# Patient Record
Sex: MALE | URBAN - METROPOLITAN AREA
[De-identification: names, ages, dates, MRNs, and addresses within clinical notes are randomized per-mention and may not be internally consistent; named-entity substitution may affect disease eponyms.]

---

## 2018-12-06 ENCOUNTER — INPATIENT (INPATIENT)
Facility: HOSPITAL | Age: 73
LOS: 7 days | Discharge: NURSING, REHAB OR LONG-TERM CARE | DRG: 56 | End: 2018-12-14
Attending: INTERNAL MEDICINE | Admitting: INTERNAL MEDICINE
Payer: MEDICARE

## 2018-12-06 VITALS
HEIGHT: 70 IN | OXYGEN SATURATION: 99 % | SYSTOLIC BLOOD PRESSURE: 146 MMHG | HEART RATE: 92 BPM | RESPIRATION RATE: 16 BRPM | WEIGHT: 250 LBS | DIASTOLIC BLOOD PRESSURE: 96 MMHG

## 2018-12-06 DIAGNOSIS — I10 ESSENTIAL (PRIMARY) HYPERTENSION: ICD-10-CM

## 2018-12-06 DIAGNOSIS — M10.9 GOUT, UNSPECIFIED: ICD-10-CM

## 2018-12-06 DIAGNOSIS — F03.90 UNSPECIFIED DEMENTIA, UNSPECIFIED SEVERITY, WITHOUT BEHAVIORAL DISTURBANCE, PSYCHOTIC DISTURBANCE, MOOD DISTURBANCE, AND ANXIETY: ICD-10-CM

## 2018-12-06 DIAGNOSIS — Z29.9 ENCOUNTER FOR PROPHYLACTIC MEASURES, UNSPECIFIED: ICD-10-CM

## 2018-12-06 DIAGNOSIS — N17.9 ACUTE KIDNEY FAILURE, UNSPECIFIED: ICD-10-CM

## 2018-12-06 DIAGNOSIS — E86.0 DEHYDRATION: ICD-10-CM

## 2018-12-06 DIAGNOSIS — R45.1 RESTLESSNESS AND AGITATION: ICD-10-CM

## 2018-12-06 DIAGNOSIS — E87.0 HYPEROSMOLALITY AND HYPERNATREMIA: ICD-10-CM

## 2018-12-06 DIAGNOSIS — I25.10 ATHEROSCLEROTIC HEART DISEASE OF NATIVE CORONARY ARTERY WITHOUT ANGINA PECTORIS: ICD-10-CM

## 2018-12-06 LAB
ALBUMIN SERPL ELPH-MCNC: 2.4 G/DL — LOW (ref 3.3–5)
ALP SERPL-CCNC: 55 U/L — SIGNIFICANT CHANGE UP (ref 30–120)
ALT FLD-CCNC: 39 U/L DA — SIGNIFICANT CHANGE UP (ref 10–60)
ANION GAP SERPL CALC-SCNC: 7 MMOL/L — SIGNIFICANT CHANGE UP (ref 5–17)
APTT BLD: 28.5 SEC — SIGNIFICANT CHANGE UP (ref 28.5–37)
AST SERPL-CCNC: 25 U/L — SIGNIFICANT CHANGE UP (ref 10–40)
BASOPHILS # BLD AUTO: 0.03 K/UL — SIGNIFICANT CHANGE UP (ref 0–0.2)
BASOPHILS NFR BLD AUTO: 0.2 % — SIGNIFICANT CHANGE UP (ref 0–2)
BILIRUB SERPL-MCNC: 0.7 MG/DL — SIGNIFICANT CHANGE UP (ref 0.2–1.2)
BUN SERPL-MCNC: 30 MG/DL — HIGH (ref 7–23)
CALCIUM SERPL-MCNC: 9.3 MG/DL — SIGNIFICANT CHANGE UP (ref 8.4–10.5)
CHLORIDE SERPL-SCNC: 110 MMOL/L — HIGH (ref 96–108)
CO2 SERPL-SCNC: 35 MMOL/L — HIGH (ref 22–31)
CREAT SERPL-MCNC: 1.38 MG/DL — HIGH (ref 0.5–1.3)
EOSINOPHIL # BLD AUTO: 0.02 K/UL — SIGNIFICANT CHANGE UP (ref 0–0.5)
EOSINOPHIL NFR BLD AUTO: 0.2 % — SIGNIFICANT CHANGE UP (ref 0–6)
GLUCOSE SERPL-MCNC: 126 MG/DL — HIGH (ref 70–99)
HCT VFR BLD CALC: 43.2 % — SIGNIFICANT CHANGE UP (ref 39–50)
HGB BLD-MCNC: 13.3 G/DL — SIGNIFICANT CHANGE UP (ref 13–17)
IMM GRANULOCYTES NFR BLD AUTO: 0.9 % — SIGNIFICANT CHANGE UP (ref 0–1.5)
INR BLD: 1.3 RATIO — HIGH (ref 0.88–1.16)
LYMPHOCYTES # BLD AUTO: 0.98 K/UL — LOW (ref 1–3.3)
LYMPHOCYTES # BLD AUTO: 7.6 % — LOW (ref 13–44)
MCHC RBC-ENTMCNC: 29.7 PG — SIGNIFICANT CHANGE UP (ref 27–34)
MCHC RBC-ENTMCNC: 30.8 GM/DL — LOW (ref 32–36)
MCV RBC AUTO: 96.4 FL — SIGNIFICANT CHANGE UP (ref 80–100)
MONOCYTES # BLD AUTO: 1.19 K/UL — HIGH (ref 0–0.9)
MONOCYTES NFR BLD AUTO: 9.3 % — SIGNIFICANT CHANGE UP (ref 2–14)
NEUTROPHILS # BLD AUTO: 10.52 K/UL — HIGH (ref 1.8–7.4)
NEUTROPHILS NFR BLD AUTO: 81.8 % — HIGH (ref 43–77)
NRBC # BLD: 0 /100 WBCS — SIGNIFICANT CHANGE UP (ref 0–0)
PLATELET # BLD AUTO: 381 K/UL — SIGNIFICANT CHANGE UP (ref 150–400)
POTASSIUM SERPL-MCNC: 4.2 MMOL/L — SIGNIFICANT CHANGE UP (ref 3.5–5.3)
POTASSIUM SERPL-SCNC: 4.2 MMOL/L — SIGNIFICANT CHANGE UP (ref 3.5–5.3)
PROT SERPL-MCNC: 7.7 G/DL — SIGNIFICANT CHANGE UP (ref 6–8.3)
PROTHROM AB SERPL-ACNC: 14.3 SEC — HIGH (ref 10–12.9)
RBC # BLD: 4.48 M/UL — SIGNIFICANT CHANGE UP (ref 4.2–5.8)
RBC # FLD: 13.2 % — SIGNIFICANT CHANGE UP (ref 10.3–14.5)
SODIUM SERPL-SCNC: 152 MMOL/L — HIGH (ref 135–145)
WBC # BLD: 12.85 K/UL — HIGH (ref 3.8–10.5)
WBC # FLD AUTO: 12.85 K/UL — HIGH (ref 3.8–10.5)

## 2018-12-06 PROCEDURE — 99285 EMERGENCY DEPT VISIT HI MDM: CPT

## 2018-12-06 PROCEDURE — 71045 X-RAY EXAM CHEST 1 VIEW: CPT | Mod: 26

## 2018-12-06 PROCEDURE — 93010 ELECTROCARDIOGRAM REPORT: CPT

## 2018-12-06 RX ORDER — TAMSULOSIN HYDROCHLORIDE 0.4 MG/1
0.8 CAPSULE ORAL AT BEDTIME
Refills: 0 | Status: DISCONTINUED | OUTPATIENT
Start: 2018-12-06 | End: 2018-12-14

## 2018-12-06 RX ORDER — HEPARIN SODIUM 5000 [USP'U]/ML
5000 INJECTION INTRAVENOUS; SUBCUTANEOUS EVERY 12 HOURS
Refills: 0 | Status: DISCONTINUED | OUTPATIENT
Start: 2018-12-06 | End: 2018-12-14

## 2018-12-06 RX ORDER — ACETAMINOPHEN 500 MG
650 TABLET ORAL EVERY 6 HOURS
Refills: 0 | Status: DISCONTINUED | OUTPATIENT
Start: 2018-12-06 | End: 2018-12-14

## 2018-12-06 RX ORDER — PREGABALIN 225 MG/1
500 CAPSULE ORAL DAILY
Refills: 0 | Status: DISCONTINUED | OUTPATIENT
Start: 2018-12-06 | End: 2018-12-14

## 2018-12-06 RX ORDER — DIVALPROEX SODIUM 500 MG/1
1000 TABLET, DELAYED RELEASE ORAL AT BEDTIME
Refills: 0 | Status: DISCONTINUED | OUTPATIENT
Start: 2018-12-06 | End: 2018-12-10

## 2018-12-06 RX ORDER — DIVALPROEX SODIUM 500 MG/1
1000 TABLET, DELAYED RELEASE ORAL AT BEDTIME
Refills: 0 | Status: DISCONTINUED | OUTPATIENT
Start: 2018-12-06 | End: 2018-12-06

## 2018-12-06 RX ORDER — FAMOTIDINE 10 MG/ML
20 INJECTION INTRAVENOUS DAILY
Refills: 0 | Status: DISCONTINUED | OUTPATIENT
Start: 2018-12-06 | End: 2018-12-14

## 2018-12-06 RX ORDER — QUETIAPINE FUMARATE 200 MG/1
100 TABLET, FILM COATED ORAL AT BEDTIME
Refills: 0 | Status: DISCONTINUED | OUTPATIENT
Start: 2018-12-06 | End: 2018-12-14

## 2018-12-06 RX ORDER — TRAMADOL HYDROCHLORIDE 50 MG/1
25 TABLET ORAL EVERY 6 HOURS
Refills: 0 | Status: DISCONTINUED | OUTPATIENT
Start: 2018-12-06 | End: 2018-12-11

## 2018-12-06 RX ORDER — SENNA PLUS 8.6 MG/1
2 TABLET ORAL AT BEDTIME
Refills: 0 | Status: DISCONTINUED | OUTPATIENT
Start: 2018-12-06 | End: 2018-12-14

## 2018-12-06 RX ORDER — SODIUM CHLORIDE 9 MG/ML
1000 INJECTION INTRAMUSCULAR; INTRAVENOUS; SUBCUTANEOUS ONCE
Refills: 0 | Status: COMPLETED | OUTPATIENT
Start: 2018-12-06 | End: 2018-12-06

## 2018-12-06 RX ORDER — SODIUM CHLORIDE 9 MG/ML
1000 INJECTION, SOLUTION INTRAVENOUS
Refills: 0 | Status: DISCONTINUED | OUTPATIENT
Start: 2018-12-06 | End: 2018-12-14

## 2018-12-06 RX ORDER — DIVALPROEX SODIUM 500 MG/1
500 TABLET, DELAYED RELEASE ORAL
Refills: 0 | Status: DISCONTINUED | OUTPATIENT
Start: 2018-12-06 | End: 2018-12-10

## 2018-12-06 RX ORDER — PREGABALIN 225 MG/1
500 CAPSULE ORAL ONCE
Refills: 0 | Status: DISCONTINUED | OUTPATIENT
Start: 2018-12-06 | End: 2018-12-06

## 2018-12-06 RX ORDER — POLYETHYLENE GLYCOL 3350 17 G/17G
17 POWDER, FOR SOLUTION ORAL
Refills: 0 | Status: DISCONTINUED | OUTPATIENT
Start: 2018-12-06 | End: 2018-12-14

## 2018-12-06 RX ORDER — HALOPERIDOL DECANOATE 100 MG/ML
1 INJECTION INTRAMUSCULAR EVERY 6 HOURS
Refills: 0 | Status: DISCONTINUED | OUTPATIENT
Start: 2018-12-06 | End: 2018-12-08

## 2018-12-06 RX ADMIN — SODIUM CHLORIDE 1000 MILLILITER(S): 9 INJECTION INTRAMUSCULAR; INTRAVENOUS; SUBCUTANEOUS at 14:37

## 2018-12-06 RX ADMIN — SODIUM CHLORIDE 1000 MILLILITER(S): 9 INJECTION INTRAMUSCULAR; INTRAVENOUS; SUBCUTANEOUS at 13:37

## 2018-12-06 RX ADMIN — QUETIAPINE FUMARATE 100 MILLIGRAM(S): 200 TABLET, FILM COATED ORAL at 22:03

## 2018-12-06 RX ADMIN — HALOPERIDOL DECANOATE 1 MILLIGRAM(S): 100 INJECTION INTRAMUSCULAR at 17:39

## 2018-12-06 RX ADMIN — SENNA PLUS 2 TABLET(S): 8.6 TABLET ORAL at 22:02

## 2018-12-06 RX ADMIN — HEPARIN SODIUM 5000 UNIT(S): 5000 INJECTION INTRAVENOUS; SUBCUTANEOUS at 17:39

## 2018-12-06 RX ADMIN — DIVALPROEX SODIUM 1000 MILLIGRAM(S): 500 TABLET, DELAYED RELEASE ORAL at 22:03

## 2018-12-06 RX ADMIN — SODIUM CHLORIDE 100 MILLILITER(S): 9 INJECTION, SOLUTION INTRAVENOUS at 17:39

## 2018-12-06 RX ADMIN — TAMSULOSIN HYDROCHLORIDE 0.8 MILLIGRAM(S): 0.4 CAPSULE ORAL at 22:02

## 2018-12-06 RX ADMIN — SODIUM CHLORIDE 100 MILLILITER(S): 9 INJECTION, SOLUTION INTRAVENOUS at 22:03

## 2018-12-06 NOTE — H&P ADULT - HISTORY OF PRESENT ILLNESS
74 yo AAM with hx of ALZHEIMER'S DISEASE ,SUBDURAL HEMATOMA, HTN, CAD ,GERD , HARISH ,VIT B12 DEFICIENCY ,GOUT ,CONSTIPATION .brought to ED from [facility] due to poor po intake and lethargy ,worsening for 3 days . Patient refused iv line placement and labs due to severe agitation and dementia ,in [facility] he was  combative and aggressive with medical staff and psychiatric evaluation is  requested Admit for iv hydration, monitor renal profile and urine output ,nutritionist consult ,prealbumin level ,serial bmp, nutritional supplements, multivitamins ,palliative care evaluation  regarding MOLST completion and artificial nutrition discussion .

## 2018-12-06 NOTE — ED ADULT NURSE REASSESSMENT NOTE - NS ED NURSE REASSESS COMMENT FT1
1600- Wife bedside. Pt restless- pulling at sheets, flailing his arms. IV patent & fusing well
1800- Med for agitation with Haldol as per MD. Inc of urine- cleaned & positioned.
[id number]- Dr Griffin- neurology- in attendance
Patient remains restless at times.  Wife at bedside.  Patient in no acute distress.  Will follow.  Report to be given to floor care RN.

## 2018-12-06 NOTE — ED PROVIDER NOTE - SKIN, MLM
Skin normal color for race, warm, dry. Dry ulceration of skin on forehead from scratching with fingernails

## 2018-12-06 NOTE — CONSULT NOTE ADULT - PROBLEM SELECTOR RECOMMENDATION 9
ACUTE RENAL FAILURE: due due decrease fluid intake continue with fluid   Serum creatinine is increased    , approximating GFR at   ml/min.   There is  progression . No uremic symptoms    No evidence of anemia .  Fluid status stable.  Will continue to avoid nephrotoxic drugs.  Patient remains asymptomatic.   Continue current therapy.

## 2018-12-06 NOTE — ED ADULT NURSE NOTE - NSIMPLEMENTINTERV_GEN_ALL_ED
Implemented All Fall with Harm Risk Interventions:  [place] to call system. Call bell, personal items and telephone within reach. Instruct patient to call for assistance. Room bathroom lighting operational. Non-slip footwear when patient is off stretcher. Physically safe environment: no spills, clutter or unnecessary equipment. Stretcher in lowest position, wheels locked, appropriate side rails in place. Provide visual cue, wrist band, yellow gown, etc. Monitor gait and stability. Monitor for mental status changes and reorient to person, place, and time. Review medications for side effects contributing to fall risk. Reinforce activity limits and safety measures with patient and family. Provide visual clues: red socks.

## 2018-12-06 NOTE — ED PROVIDER NOTE - MEDICAL DECISION MAKING DETAILS
72 yo dementia from [facility] with worsening agitation and confusion likely dehydration. Plan - labs, Schmuter to admit.

## 2018-12-06 NOTE — H&P ADULT - ATTENDING COMMENTS
74 yo AAM with hx of ALZHEIMER'S DISEASE ,SUBDURAL HEMATOMA, HTN, CAD ,GERD , HARISH ,VIT B12 DEFICIENCY ,GOUT ,CONSTIPATION .brought to ED from [facility] due to poor po intake and lethargy ,worsening for 3 days . Patient refused iv line placement and labs due to severe agitation and dementia ,in [facility] he was  combative and aggressive with medical staff and psychiatric evaluation is  requested Admit for iv hydration, monitor renal profile and urine output ,nutritionist consult ,prealbumin level ,serial bmp, nutritional supplements, multivitamins ,palliative care evaluation  regarding MOLST completion and artificial nutrition discussion .

## 2018-12-06 NOTE — H&P ADULT - ASSESSMENT
74 yo AAM with hx of ALZHEIMER'S DISEASE ,SUBDURAL HEMATOMA, HTN, CAD ,GERD , HARISH ,VIT B12 DEFICIENCY ,GOUT ,CONSTIPATION .brought to ED from [facility] due to poor po intake and lethargy ,worsening for 3 days . Patient refused iv line placement and labs due to severe agitation and dementia ,in [facility] he was  combative and aggressive with medical staff and psychiatric evaluation is  requested Admit for iv hydration, monitor renal profile and urine output ,nutritionist consult ,prealbumin level ,serial bmp, nutritional supplements, multivitamins ,palliative care evaluation  regarding MOLST completion and artificial nutrition discussion .

## 2018-12-06 NOTE — ED ADULT NURSE NOTE - OBJECTIVE STATEMENT
Presents to ED vi amb from [facility]. Pt sent for AMS with increased agitation. Pt is verbally abusive to staff, uncooperative with care. Upon arrival to ED pt is yelling when attempting to move him , take VS, undress him. when left alone pt quiets down & sleeps @ intervals. Color good. Skin warm & dry to touch. Lungs diminished at bases. Abd soft nontender. Open scab noted on forehead that pt picks at.

## 2018-12-06 NOTE — ED PROVIDER NOTE - OBJECTIVE STATEMENT
72 y/o M pt w/ PMHx Alzheimer's disease, subdural hematoma presents to the ED BIBA from [facility] for evaluation of agitation. Pt sent to ED by PMD for eval of agitation. Pt unable to provide hx due to pt's hx of Alzheimer's.     PMD: Dr. Marcos

## 2018-12-06 NOTE — H&P ADULT - PMH
Agitation    Alzheimer disease    ASHD (arteriosclerotic heart disease)    BPH (benign prostatic hyperplasia)    Constipation    Dementia    GERD (gastroesophageal reflux disease)    Gout    HTN (hypertension)    HARISH (obstructive sleep apnea)    SDH (subdural hematoma)    Urinary retention

## 2018-12-06 NOTE — ED ADULT NURSE NOTE - ED STAT RN HANDOFF DETAILS 2
patient received from [facility] admitted to Medicine awake responsive to verbal stimuli with confusion. IV fluids in progress , V/S WNL, wound to forehead no active bleeding noted. No meds to be administered, no bed assigned as yet continue to monitor patient.

## 2018-12-06 NOTE — H&P ADULT - NSHPLABSRESULTS_GEN_ALL_CORE
< from: Xray Chest 1 View- PORTABLE-Urgent (12.06.18 @ 13:24) >  EXAM:  XR CHEST PORTABLE URGENT 1V                        PROCEDURE DATE:  12/06/2018    INTERPRETATION:  CLINICAL STATEMENT: Chest pain.  TECHNIQUE: AP view of the chest.  COMPARISON: None available.  FINDINGS/  IMPRESSION:  No consolidation or pleural effusion.  Heart size within normal limits.  < end of copied text >

## 2018-12-06 NOTE — H&P ADULT - NSHPATTENDINGPLANDISCUSS_GEN_ALL_CORE
med staff , Dr King ,spoke to the wife Elaine on a phone [phone] home number ( emergency contact in a chart is not correct number )

## 2018-12-06 NOTE — H&P ADULT - PROBLEM SELECTOR PLAN 1
SERIAL BMP ,CONTINUE IVFS -D5 W , NEPHROLOGY EVAL ,wife refuses NGT AND GT ,patient " will pull out it any way during periods agitation " and combativeness

## 2018-12-06 NOTE — ED PROVIDER NOTE - CONSTITUTIONAL, MLM
normal... Well appearing, well nourished, awake, alert, confused and uncooperative. In no apparent distress.

## 2018-12-06 NOTE — CONSULT NOTE ADULT - ASSESSMENT
WILLIAM CAGE [facility] [address] [phone]   CONTACT Sp Elaine ANAYA [phone] self [phone]   ALLERGY nka   REASON FOR VISIT       Initial evaluation/Pulmonary Critical Care consultation requested on 12/6/2018  by Dr Marcos   from Dr Brothers   Patient examined chart reviewed  HOSPITAL ADMISSION [facility] 12/6/2018    PATIENT CAME  FROM (if information available)      IVF  D5 100 (12/6)     VITALS/LABS         12/6/2018 W 12.8 Hb 13.3 Plt 381 INR 1.3 Na 152 K 4.2 CO2 35  Cr 1.3     REVIEW OF SYMPTOMS    Able to give ROS  NO     PHYSICAL EXAM    HEENT Unremarkable PERRLA atraumatic   RESP Fair air entry EXP prolonged    Harsh breath sound Resp distres mild   CARDIAC S1 S2 No S3     NO JVD    ABDOMEN SOFT BS PRESENT NOT DISTENDED No hepatosplenomegaly PEDAL EDEMA present No calf tenderness  NO rash   GENERAL Not TOXIC looking    GLOBAL ISSUE/BEST PRACTICE:      PROBLEM: HOB elevation:   y            PROBLEM: Stress ulcer proph:    pepcid 20 (12/6)                       PROBLEM: VTE prophylaxis:      hsc (12/6)   PROBLEM: Glycemic control:    na  PROBLEM: Nutrition:    soft DASH (12/6)   PROBLEM: Advanced directive: na     PROBLEM: Allergies:  na    PATIENT DESCRIPTION PATIENT WILLIAM CAGE  12/6/2018  ADMISSION [facility]  DR GAMALIEL STEINBERG            74 y/o M pt w/ PMHx Alzheimer's disease, subdural hematoma presents to the ED BIBA from [facility] for evaluation of agitation. Pt sent to ED by PMD for eval of agitation. Pt unable to provide hx due to pt's hx of Alzheimer's.   Pt was noted to be hypernatremic Pulm consulted 12/6/2018 because pt has HO HARISH     PMH   Past Medical History:  Agitation    Alzheimer disease    ASHD (arteriosclerotic heart disease)    BPH (benign prostatic hyperplasia)    Constipation    Dementia    GERD (gastroesophageal reflux disease)    Gout    HTN (hypertension)    HARISH (obstructive sleep apnea)    SDH (subdural hematoma)    Urinary retention.    MAIN PROBLEMS   DEHYDRATION  HYPERNATREMIA  HARISH     ASSESSMENT/RECOMMENDATIONS PATIENT WILLIAM CAGE  12/6/2018  ADMISSION [facility]  DR GAMALIEL STEINBERG       DEHYDRATION HYPERNATREMIA  D5 100 (12/6)   HARISH   CPAP 10 (12/5) ordered empirically   NEUROPSYCHOTROPIC   Quetiapine 100 (12/6)   depakote 500.2 (12/6)   depakote 1000 (12/6)   Haldol 1.4p (12/6)   BPH  Tamsulosin .4 (12/6)          TIME SPENT Over 55 minutes aggregate care time spent on encounter; activities included   direct patient care, counseling and/or coordinating care reviewing notes, lab data/ imaging , discussion with multidisciplinary team/ patient  /family. Risks, benefits, alternatives  discussed in detail.
72 yo AAM with hx of ALZHEIMER'S DISEASE ,SUBDURAL HEMATOMA, HTN, CAD ,GERD , HARISH ,VIT B12 DEFICIENCY ,GOUT ,CONSTIPATION .brought to ED from [facility] due to poor po intake and lethargy ,worsening for 3 days . Patient refused iv line placement and labs due to severe agitation and dementia ,in [facility] he was  combative and aggressive with medical staff and psychiatric evaluation is  requested Admit for iv hydration, now with hypernatremia ,  haleigh

## 2018-12-07 LAB
ANION GAP SERPL CALC-SCNC: 6 MMOL/L — SIGNIFICANT CHANGE UP (ref 5–17)
BUN SERPL-MCNC: 21 MG/DL — SIGNIFICANT CHANGE UP (ref 7–23)
CALCIUM SERPL-MCNC: 8.7 MG/DL — SIGNIFICANT CHANGE UP (ref 8.4–10.5)
CHLORIDE SERPL-SCNC: 111 MMOL/L — HIGH (ref 96–108)
CO2 SERPL-SCNC: 32 MMOL/L — HIGH (ref 22–31)
CREAT SERPL-MCNC: 1.03 MG/DL — SIGNIFICANT CHANGE UP (ref 0.5–1.3)
GLUCOSE SERPL-MCNC: 109 MG/DL — HIGH (ref 70–99)
HCT VFR BLD CALC: 38.2 % — LOW (ref 39–50)
HGB BLD-MCNC: 11.9 G/DL — LOW (ref 13–17)
MCHC RBC-ENTMCNC: 29.2 PG — SIGNIFICANT CHANGE UP (ref 27–34)
MCHC RBC-ENTMCNC: 31.2 GM/DL — LOW (ref 32–36)
MCV RBC AUTO: 93.6 FL — SIGNIFICANT CHANGE UP (ref 80–100)
NRBC # BLD: 0 /100 WBCS — SIGNIFICANT CHANGE UP (ref 0–0)
PLATELET # BLD AUTO: 302 K/UL — SIGNIFICANT CHANGE UP (ref 150–400)
POTASSIUM SERPL-MCNC: 4 MMOL/L — SIGNIFICANT CHANGE UP (ref 3.5–5.3)
POTASSIUM SERPL-SCNC: 4 MMOL/L — SIGNIFICANT CHANGE UP (ref 3.5–5.3)
PREALB SERPL-MCNC: 9 MG/DL — LOW (ref 20–40)
RBC # BLD: 4.08 M/UL — LOW (ref 4.2–5.8)
RBC # FLD: 13.2 % — SIGNIFICANT CHANGE UP (ref 10.3–14.5)
SODIUM SERPL-SCNC: 149 MMOL/L — HIGH (ref 135–145)
VALPROATE SERPL-MCNC: 63 UG/ML — SIGNIFICANT CHANGE UP (ref 50–100)
WBC # BLD: 9.86 K/UL — SIGNIFICANT CHANGE UP (ref 3.8–10.5)
WBC # FLD AUTO: 9.86 K/UL — SIGNIFICANT CHANGE UP (ref 3.8–10.5)

## 2018-12-07 RX ORDER — HALOPERIDOL DECANOATE 100 MG/ML
2 INJECTION INTRAMUSCULAR ONCE
Refills: 0 | Status: COMPLETED | OUTPATIENT
Start: 2018-12-07 | End: 2018-12-07

## 2018-12-07 RX ADMIN — SENNA PLUS 2 TABLET(S): 8.6 TABLET ORAL at 21:47

## 2018-12-07 RX ADMIN — SODIUM CHLORIDE 100 MILLILITER(S): 9 INJECTION, SOLUTION INTRAVENOUS at 09:22

## 2018-12-07 RX ADMIN — HALOPERIDOL DECANOATE 2 MILLIGRAM(S): 100 INJECTION INTRAMUSCULAR at 17:31

## 2018-12-07 RX ADMIN — DIVALPROEX SODIUM 1000 MILLIGRAM(S): 500 TABLET, DELAYED RELEASE ORAL at 21:47

## 2018-12-07 RX ADMIN — HEPARIN SODIUM 5000 UNIT(S): 5000 INJECTION INTRAVENOUS; SUBCUTANEOUS at 18:22

## 2018-12-07 RX ADMIN — TAMSULOSIN HYDROCHLORIDE 0.8 MILLIGRAM(S): 0.4 CAPSULE ORAL at 21:46

## 2018-12-07 RX ADMIN — HALOPERIDOL DECANOATE 1 MILLIGRAM(S): 100 INJECTION INTRAMUSCULAR at 12:58

## 2018-12-07 RX ADMIN — QUETIAPINE FUMARATE 100 MILLIGRAM(S): 200 TABLET, FILM COATED ORAL at 21:47

## 2018-12-07 RX ADMIN — HEPARIN SODIUM 5000 UNIT(S): 5000 INJECTION INTRAVENOUS; SUBCUTANEOUS at 05:58

## 2018-12-07 NOTE — GOALS OF CARE CONVERSATION - PERSONAL ADVANCE DIRECTIVE - CONVERSATION DETAILS
Pt never completed a HCP ,wife is surrogate. She states he never discussed his wishes .Discussed resuscitation with her she will think about it doesn't know what to think right now. Pt never completed a HCP ,wife is surrogate. She states he never discussed his wishes .Discussed resuscitation with her she will think about it doesn't know what to think right now.  12/14/18  spoke with pts wife/HCP discussed resuscitation consented to MOLST DNR DNI limited medical,no feeding tube .Pt to be D/C d to rehab

## 2018-12-07 NOTE — PROGRESS NOTE ADULT - PROBLEM SELECTOR PLAN 1
SERIAL BMP ,CONTINUE IVFS -D5 W , NEPHROLOGY EVAL SERIAL BMP ,CONTINUE IVFS -D5 W , NEPHROLOGY EVAL ,wife refuses NGT AND GT ,patient " will pull out it any way during periods agitation " and combativeness

## 2018-12-07 NOTE — PROGRESS NOTE ADULT - NSHPATTENDINGPLANDISCUSS_GEN_ALL_CORE
med staff , Dr King ,spoke to the wife Elaine at bedside med staff , Dr King ,spoke to the wife Elaine on a phone [phone] home number ( emergency contact in a chart is not correct number )

## 2018-12-07 NOTE — DIETITIAN INITIAL EVALUATION ADULT. - OTHER INFO
Pt assessed as per length of stay policy: Pt admitted for  possible seizure  32yo male no Pmhx  was convulsing PTA. MRI was unrearkable.  Per neuro MD,  unresponsiveness  2/2 questionable respiratory hypoxia  induced sz. Pt denies recent change in appetite or weight. He denies trouble chewing/swallowing. Per MD note d/c once CPK levels decrease. Pt referral received for decreased po intake PTA. Pt from [facility] c hx ALZHEIMER'S DISEASE ,SUBDURAL HEMATOMA, HTN, CAD ,GERD , HARISH ,VIT B12 DEFICIENCY ,GOUT ,CONSTIPATION .brought to ED from [facility] due to poor po intake and lethargy x 3 days. Per NH records pt also was agitated  & refused labs, IV and meals. Pt weight 257# and pt was on regular diet per NH orders. Admit weight 225#. Pt appears closer to former weight. Recommend re-weight. Physically he does not appear malnourished.  It appears that pt refusal to eat along with agitation is dementia related. Per neurologist note pt doesn't appear to have had CVA and behavior likely 2/2 progressive dementia. No noted Pt referral received for decreased po intake PTA. Pt from [facility] c hx ALZHEIMER'S DISEASE ,SUBDURAL HEMATOMA, HTN, CAD ,GERD , HARISH ,VIT B12 DEFICIENCY ,GOUT ,CONSTIPATION .brought to ED from [facility] due to poor po intake and lethargy x 3 days. Per NH records pt also was agitated  & refused labs, IV and meals. Pt weight 257# and pt was on regular diet per NH orders. Admit weight 225#. Pt appears closer to former weight. Recommend re-weight. Physically he does not appear malnourished.  It appears that pt refusal to eat along with agitation is dementia related. Per neurologist note pt doesn't appear to have had CVA and behavior likely 2/2 progressive dementia. No noted skinbreakdown and/or edema. Will recommend supplement ensure enlive TID as intervention for now. At this time pt does not meet criteria for malnutrition but is high risk. Per Pallative RN note, spouse has not made any decision re DNR/DNI, feeding tube. Continue to monitor. Pt referral received for decreased po intake PTA. Pt from [facility] c hx ALZHEIMER'S DISEASE ,SUBDURAL HEMATOMA, HTN, CAD ,GERD , HARISH ,VIT B12 DEFICIENCY ,GOUT ,CONSTIPATION .brought to ED from [facility] due to poor po intake and lethargy x 3 days. Per NH records pt also was agitated  & refused labs, IV and meals. Pt weight 257# and pt was on regular diet per NH orders. Admit weight 225#. Pt appears closer to former weight. Recommend re-weigh. Physically he does not appear malnourished.  It appears that pt refusal to eat along with agitation is dementia related. Per neurologist note pt doesn't appear to have had CVA and behavior  is likely 2/2 progressive dementia. No noted skinbreakdown and/or edema. Will recommend supplement ensure enlive TID as intervention for now. At this time pt does not meet criteria for malnutrition but is high risk.  There is no MOLST from NH: Per Pallative RN note, spouse has not made any decisions regarding goals of care.  If pt continues to refuse meals then will have to consider feeding tube. Continue to monitor. Pt referral received for decreased po intake PTA. Pt from [facility] c hx ALZHEIMER'S DISEASE ,SUBDURAL HEMATOMA, HTN, CAD ,GERD , HARISH ,VIT B12 DEFICIENCY ,GOUT ,CONSTIPATION .brought to ED from [facility] due to poor po intake and lethargy x 3 days. Per NH records pt also was agitated  & refused labs, IV and meals. Pt weight 257# and pt was on regular diet per NH orders. Admit weight 225#. Pt appears closer to former weight. Recommend re-weigh. Physically he does not appear malnourished.  It appears that pt refusal to eat along with agitation is dementia related. Per neurologist note pt doesn't appear to have had CVA and behavior  is likely 2/2 progressive dementia.  Unsure if he is candidate for remeron?No noted skinbreakdown and/or edema. Will recommend supplement ensure enlive TID as intervention for now. At this time pt does not meet criteria for malnutrition but is high risk.  There is no MOLST from NH: Per Pallative RN note, spouse has not made any decisions regarding goals of care.  If pt continues to refuse meals then will have to consider feeding tube. Continue to monitor.

## 2018-12-07 NOTE — PROGRESS NOTE ADULT - SUBJECTIVE AND OBJECTIVE BOX
Neurology follow up note    NILES [id number]      Interval History:    Patient awake in bed     MEDICATIONS    acetaminophen   Tablet .. 650 milliGRAM(s) Oral every 6 hours PRN  cyanocobalamin 500 MICROGram(s) Oral daily  dextrose 5%. 1000 milliLiter(s) IV Continuous <Continuous>  diVALproex  milliGRAM(s) Oral <User Schedule>  diVALproex DR 1000 milliGRAM(s) Oral at bedtime  famotidine    Tablet 20 milliGRAM(s) Oral daily  haloperidol    Injectable 1 milliGRAM(s) IntraMuscular every 6 hours PRN  heparin  Injectable 5000 Unit(s) SubCutaneous every 12 hours  polyethylene glycol 3350 17 Gram(s) Oral two times a day  QUEtiapine 100 milliGRAM(s) Oral at bedtime  senna 2 Tablet(s) Oral at bedtime  tamsulosin 0.8 milliGRAM(s) Oral at bedtime  traMADol 25 milliGRAM(s) Oral every 6 hours PRN      Allergies    No Known Allergies    Intolerances        Height (cm): 177.8 (12-06 @ 12:55)  Weight (kg): 113.4 (12-06 @ 12:55)  BMI (kg/m2): 35.9 (12-06 @ 12:55)    Vital Signs Last 24 Hrs  T(C): 37 (07 Dec 2018 05:02), Max: 37.1 (06 Dec 2018 21:30)  T(F): 98.6 (07 Dec 2018 05:02), Max: 98.7 (06 Dec 2018 21:30)  HR: 77 (07 Dec 2018 05:02) (72 - 92)  BP: 124/73 (07 Dec 2018 05:02) (124/73 - 146/96)  BP(mean): --  RR: 17 (07 Dec 2018 05:02) (16 - 18)  SpO2: 96% (07 Dec 2018 05:02) (96% - 99%)      REVIEW OF SYSTEMS: Limited or unable to obtain secondary to patient's poor mental status.        On Neurological Examination:  The patient is arousable to verbal stimuli, opens eyes.    Extraocular movements were intact.  The patient will say irrelevant things.    Speech was fluent.  No dysarthria noted.    Motor, bilateral upper appeared to be 4/5.    Bilateral lower, plantar stimulation and flexation of the hip and knee was 3/5.    Did not notice any gross focality.    Exam is limited secondary to the patient not following commands.    Does not follow commands    GENERAL Exam: Nontoxic , No Acute Distress   	  HEENT:  normocephalic, atraumatic  		  LUNGS: Clear bilaterally    	  HEART: Normal S1S2   No murmur RRR        	  GI/ ABDOMEN:  Soft  Non tender    EXTREMITIES:   No Edema  No Clubbing  No Cyanosis     MUSCULOSKELETAL: decreased Range of Motion all 4 extremities   	   SKIN: Normal  No Ecchymosis               LABS:  CBC Full  -  ( 07 Dec 2018 08:26 )  WBC Count : 9.86 K/uL  Hemoglobin : 11.9 g/dL  Hematocrit : 38.2 %  Platelet Count - Automated : 302 K/uL  Mean Cell Volume : 93.6 fl  Mean Cell Hemoglobin : 29.2 pg  Mean Cell Hemoglobin Concentration : 31.2 gm/dL  Auto Neutrophil # : x  Auto Lymphocyte # : x  Auto Monocyte # : x  Auto Eosinophil # : x  Auto Basophil # : x  Auto Neutrophil % : x  Auto Lymphocyte % : x  Auto Monocyte % : x  Auto Eosinophil % : x  Auto Basophil % : x      12-07    149<H>  |  111<H>  |  21  ----------------------------<  109<H>  4.0   |  32<H>  |  1.03    Ca    8.7      07 Dec 2018 08:26    TPro  7.7  /  Alb  2.4<L>  /  TBili  0.7  /  DBili  x   /  AST  25  /  ALT  39  /  AlkPhos  55  12-06    Hemoglobin A1C:     LIVER FUNCTIONS - ( 06 Dec 2018 13:42 )  Alb: 2.4 g/dL / Pro: 7.7 g/dL / ALK PHOS: 55 U/L / ALT: 39 U/L DA / AST: 25 U/L / GGT: x           Vitamin B12   PT/INR - ( 06 Dec 2018 13:42 )   PT: 14.3 sec;   INR: 1.30 ratio         PTT - ( 06 Dec 2018 13:42 )  PTT:28.5 sec      RADIOLOGY    ANALYSIS AND PLAN:  A 73-year-old with episode of change in the mental status, history of subdural hematoma.  1.	For change in the mental status, suspect most likely secondary to progressive dementia along with underlying metabolic etiology, hypernatremia, and decreased oral intake.  Examination was limited but I do not see any clear signs to suggest new cerebrovascular accident had ensued.  2.	I would recommend IV fluid hydration.  3.	Monitor oral intake.  4.	Monitor sodium levels.  5.	For history of dementia, the patient did try medications in the past but appeared to have side effects secondary to the patient's age and it appeared to be advanced, did not feel that medications would be of any benefit.  6.	For history of subdural hematoma, as per my conversation with spouse, his last CT imaging showed had resolution of it.  7.	Spoke with spouse Elaine at bedside, her telephone number is [phone]. 12/7/18  She understands while in the hospital setting may become more disoriented.  8.	For agitation, continue the patient on his Depakote and Seroquel.  Haldol was added.  Make adjustments to medications as needed. Patient on Depakote as a mood stabilizer not for seizures ok to bojorquez to alt mood stabilizer   Thank you for the courtesy of consultation.    Physical therapy evaluation as tolerated  OOB to chair/ambulation with assistance only if possible.    Greater than 45 minutes spent in direct patient care reviewing  the notes, lab data/ imaging , discussion with multidisciplinary team.

## 2018-12-07 NOTE — PROGRESS NOTE ADULT - SUBJECTIVE AND OBJECTIVE BOX
Patient is a 73y Male whom presented to the hospital with ckd and haleigh . hypernatremia     PAST MEDICAL & SURGICAL HISTORY:  Alzheimer disease  Urinary retention  BPH (benign prostatic hyperplasia)  Constipation  HTN (hypertension)  SDH (subdural hematoma)  HARISH (obstructive sleep apnea)  GERD (gastroesophageal reflux disease)  Agitation  Dementia  ASHD (arteriosclerotic heart disease)  Gout      MEDICATIONS  (STANDING):  cyanocobalamin 500 MICROGram(s) Oral daily  dextrose 5%. 1000 milliLiter(s) (100 mL/Hr) IV Continuous <Continuous>  diVALproex  milliGRAM(s) Oral <User Schedule>  diVALproex DR 1000 milliGRAM(s) Oral at bedtime  famotidine    Tablet 20 milliGRAM(s) Oral daily  heparin  Injectable 5000 Unit(s) SubCutaneous every 12 hours  polyethylene glycol 3350 17 Gram(s) Oral two times a day  QUEtiapine 100 milliGRAM(s) Oral at bedtime  senna 2 Tablet(s) Oral at bedtime  tamsulosin 0.8 milliGRAM(s) Oral at bedtime      Allergies    No Known Allergies    Intolerances        SOCIAL HISTORY:  Denies ETOh,Smoking,     FAMILY HISTORY:  No pertinent family history in first degree relatives      REVIEW OF SYSTEMS:    unable to obtaind a good ros                           11.9   9.86  )-----------( 302      ( 07 Dec 2018 08:26 )             38.2       CBC Full  -  ( 07 Dec 2018 08:26 )  WBC Count : 9.86 K/uL  Hemoglobin : 11.9 g/dL  Hematocrit : 38.2 %  Platelet Count - Automated : 302 K/uL  Mean Cell Volume : 93.6 fl  Mean Cell Hemoglobin : 29.2 pg  Mean Cell Hemoglobin Concentration : 31.2 gm/dL  Auto Neutrophil # : x  Auto Lymphocyte # : x  Auto Monocyte # : x  Auto Eosinophil # : x  Auto Basophil # : x  Auto Neutrophil % : x  Auto Lymphocyte % : x  Auto Monocyte % : x  Auto Eosinophil % : x  Auto Basophil % : x      12-07    149<H>  |  111<H>  |  21  ----------------------------<  109<H>  4.0   |  32<H>  |  1.03    Ca    8.7      07 Dec 2018 08:26    TPro  7.7  /  Alb  2.4<L>  /  TBili  0.7  /  DBili  x   /  AST  25  /  ALT  39  /  AlkPhos  55  12-06      CAPILLARY BLOOD GLUCOSE          Vital Signs Last 24 Hrs  T(C): 36.8 (07 Dec 2018 17:30), Max: 37.7 (07 Dec 2018 09:12)  T(F): 98.2 (07 Dec 2018 17:30), Max: 99.9 (07 Dec 2018 09:12)  HR: 99 (07 Dec 2018 17:30) (72 - 99)  BP: 138/72 (07 Dec 2018 17:30) (124/73 - 139/756)  BP(mean): --  RR: 18 (07 Dec 2018 17:30) (17 - 18)  SpO2: 94% (07 Dec 2018 17:30) (93% - 98%)        PT/INR - ( 06 Dec 2018 13:42 )   PT: 14.3 sec;   INR: 1.30 ratio         PTT - ( 06 Dec 2018 13:42 )  PTT:28.5 sec  PHYSICAL EXAM:    Constitutional: NAD  HEENT: conjunctive   clear   Neck:  No JVD  Respiratory: decrease bs b/l   Cardiovascular: S1 and S2  Gastrointestinal: BS+, soft, NT/ND  Extremities: No peripheral edema  Neurological:  no focal deficits  Psychiatric: unable to obtained   Skin: dry   Access: Not applicable

## 2018-12-07 NOTE — PROGRESS NOTE ADULT - SUBJECTIVE AND OBJECTIVE BOX
WILLIAM CAGE [facility] S [phone] [id number]   CONTACT Sp Elaine ANAYA [phone] self [phone]   ALLERGY nka   REASON FOR VISIT     Subsequent pulmonary followup  Initial evaluation/Pulmonary Critical Care consultation requested on 12/6/2018  by Dr Marcos   from Dr Brothers   Patient examined chart reviewed  HOSPITAL ADMISSION [facility] 12/6/2018    PATIENT CAME  FROM (if information available)      IVF  D5 100 (12/6)     VITALS/LABS       12/7/2018 afeb 77 120/70 17 96% 102   12/7/2018 W 9.8 Hb 11.9 Plt 302   12/6/2018 W 12.8 Hb 13.3 Plt 381 INR 1.3 Na 152 K 4.2 CO2 35  Cr 1.3     REVIEW OF SYMPTOMS    Able to give ROS  NO     PHYSICAL EXAM    HEENT Unremarkable PERRLA atraumatic   RESP Fair air entry EXP prolonged    Harsh breath sound Resp distres mild   CARDIAC S1 S2 No S3     NO JVD    ABDOMEN SOFT BS PRESENT NOT DISTENDED No hepatosplenomegaly PEDAL EDEMA present No calf tenderness  NO rash   GENERAL Not TOXIC looking    GLOBAL ISSUE/BEST PRACTICE:      PROBLEM: HOB elevation:   y            PROBLEM: Stress ulcer proph:    pepcid 20 (12/6)                       PROBLEM: VTE prophylaxis:      hsc (12/6)   PROBLEM: Glycemic control:    na  PROBLEM: Nutrition:    soft DASH (12/6)   PROBLEM: Advanced directive: na     PROBLEM: Allergies:  na    PATIENT DESCRIPTION PATIENT WILLIAM CAGE  12/6/2018  ADMISSION [facility] S  DR GAMALIEL STEINBERG            72 y/o M pt w/ PMHx Alzheimer's disease, subdural hematoma presents to the ED BIBA from [facility] for evaluation of agitation. Pt sent to ED by PMD for eval of agitation. Pt unable to provide hx due to pt's hx of Alzheimer's.   Pt was noted to be hypernatremic Pulm consulted 12/6/2018 because pt has HO HARISH     ASSESSMENT/RECOMMENDATIONS PATIENT WILLIAM CAGE  12/6/2018  ADMISSION [facility] S  DR GAMALIEL STEINBERG       DEHYDRATION HYPERNATREMIA  D5 100 (12/6)   12/7/2018 Await Na   HARISH   CPAP 10 (12/5) ordered empirically   12/7/2018 DW pt spouse last night She agreed that pt likely will not keep cpap on   NEUROPSYCHOTROPIC   Quetiapine 100 (12/6)   depakote 500.2 (12/6)   depakote 1000 (12/6)   Haldol 1.4p (12/6)   BPH  Tamsulosin .4 (12/6)          TIME SPENT Over 25 minutes aggregate care time spent on encounter; activities included   direct patient care, counseling and/or coordinating care reviewing notes, lab data/ imaging , discussion with multidisciplinary team/ patient  /family. Risks, benefits, alternatives  discussed in detail.

## 2018-12-08 DIAGNOSIS — F05 DELIRIUM DUE TO KNOWN PHYSIOLOGICAL CONDITION: ICD-10-CM

## 2018-12-08 LAB
ANION GAP SERPL CALC-SCNC: 10 MMOL/L — SIGNIFICANT CHANGE UP (ref 5–17)
BUN SERPL-MCNC: 14 MG/DL — SIGNIFICANT CHANGE UP (ref 7–23)
CALCIUM SERPL-MCNC: 8.8 MG/DL — SIGNIFICANT CHANGE UP (ref 8.4–10.5)
CHLORIDE SERPL-SCNC: 108 MMOL/L — SIGNIFICANT CHANGE UP (ref 96–108)
CO2 SERPL-SCNC: 28 MMOL/L — SIGNIFICANT CHANGE UP (ref 22–31)
CREAT SERPL-MCNC: 1.04 MG/DL — SIGNIFICANT CHANGE UP (ref 0.5–1.3)
GLUCOSE SERPL-MCNC: 110 MG/DL — HIGH (ref 70–99)
HCT VFR BLD CALC: 40.5 % — SIGNIFICANT CHANGE UP (ref 39–50)
HGB BLD-MCNC: 12.8 G/DL — LOW (ref 13–17)
MCHC RBC-ENTMCNC: 29.4 PG — SIGNIFICANT CHANGE UP (ref 27–34)
MCHC RBC-ENTMCNC: 31.6 GM/DL — LOW (ref 32–36)
MCV RBC AUTO: 93.1 FL — SIGNIFICANT CHANGE UP (ref 80–100)
NRBC # BLD: 0 /100 WBCS — SIGNIFICANT CHANGE UP (ref 0–0)
PLATELET # BLD AUTO: 310 K/UL — SIGNIFICANT CHANGE UP (ref 150–400)
POTASSIUM SERPL-MCNC: 3.4 MMOL/L — LOW (ref 3.5–5.3)
POTASSIUM SERPL-SCNC: 3.4 MMOL/L — LOW (ref 3.5–5.3)
RBC # BLD: 4.35 M/UL — SIGNIFICANT CHANGE UP (ref 4.2–5.8)
RBC # FLD: 12.8 % — SIGNIFICANT CHANGE UP (ref 10.3–14.5)
SODIUM SERPL-SCNC: 146 MMOL/L — HIGH (ref 135–145)
WBC # BLD: 11.91 K/UL — HIGH (ref 3.8–10.5)
WBC # FLD AUTO: 11.91 K/UL — HIGH (ref 3.8–10.5)

## 2018-12-08 PROCEDURE — 90792 PSYCH DIAG EVAL W/MED SRVCS: CPT

## 2018-12-08 RX ORDER — POTASSIUM CHLORIDE 20 MEQ
10 PACKET (EA) ORAL
Refills: 0 | Status: COMPLETED | OUTPATIENT
Start: 2018-12-08 | End: 2018-12-08

## 2018-12-08 RX ORDER — HALOPERIDOL DECANOATE 100 MG/ML
3 INJECTION INTRAMUSCULAR EVERY 4 HOURS
Refills: 0 | Status: DISCONTINUED | OUTPATIENT
Start: 2018-12-08 | End: 2018-12-14

## 2018-12-08 RX ORDER — RISPERIDONE 4 MG/1
1 TABLET ORAL DAILY
Refills: 0 | Status: DISCONTINUED | OUTPATIENT
Start: 2018-12-08 | End: 2018-12-14

## 2018-12-08 RX ORDER — HALOPERIDOL DECANOATE 100 MG/ML
2 INJECTION INTRAMUSCULAR EVERY 4 HOURS
Refills: 0 | Status: DISCONTINUED | OUTPATIENT
Start: 2018-12-08 | End: 2018-12-08

## 2018-12-08 RX ORDER — RISPERIDONE 4 MG/1
1 TABLET ORAL
Refills: 0 | Status: DISCONTINUED | OUTPATIENT
Start: 2018-12-08 | End: 2018-12-14

## 2018-12-08 RX ADMIN — HALOPERIDOL DECANOATE 1 MILLIGRAM(S): 100 INJECTION INTRAMUSCULAR at 04:09

## 2018-12-08 RX ADMIN — FAMOTIDINE 20 MILLIGRAM(S): 10 INJECTION INTRAVENOUS at 12:02

## 2018-12-08 RX ADMIN — HALOPERIDOL DECANOATE 3 MILLIGRAM(S): 100 INJECTION INTRAMUSCULAR at 17:02

## 2018-12-08 RX ADMIN — HEPARIN SODIUM 5000 UNIT(S): 5000 INJECTION INTRAVENOUS; SUBCUTANEOUS at 17:03

## 2018-12-08 RX ADMIN — Medication 100 MILLIEQUIVALENT(S): at 12:01

## 2018-12-08 RX ADMIN — Medication 100 MILLIEQUIVALENT(S): at 10:37

## 2018-12-08 RX ADMIN — PREGABALIN 500 MICROGRAM(S): 225 CAPSULE ORAL at 12:02

## 2018-12-08 RX ADMIN — TAMSULOSIN HYDROCHLORIDE 0.8 MILLIGRAM(S): 0.4 CAPSULE ORAL at 21:18

## 2018-12-08 RX ADMIN — RISPERIDONE 1 MILLIGRAM(S): 4 TABLET ORAL at 16:53

## 2018-12-08 RX ADMIN — SENNA PLUS 2 TABLET(S): 8.6 TABLET ORAL at 21:18

## 2018-12-08 RX ADMIN — QUETIAPINE FUMARATE 100 MILLIGRAM(S): 200 TABLET, FILM COATED ORAL at 21:18

## 2018-12-08 RX ADMIN — DIVALPROEX SODIUM 500 MILLIGRAM(S): 500 TABLET, DELAYED RELEASE ORAL at 08:37

## 2018-12-08 RX ADMIN — HEPARIN SODIUM 5000 UNIT(S): 5000 INJECTION INTRAVENOUS; SUBCUTANEOUS at 06:18

## 2018-12-08 RX ADMIN — DIVALPROEX SODIUM 1000 MILLIGRAM(S): 500 TABLET, DELAYED RELEASE ORAL at 21:18

## 2018-12-08 RX ADMIN — Medication 100 MILLIEQUIVALENT(S): at 13:14

## 2018-12-08 RX ADMIN — DIVALPROEX SODIUM 500 MILLIGRAM(S): 500 TABLET, DELAYED RELEASE ORAL at 13:16

## 2018-12-08 NOTE — PROGRESS NOTE ADULT - SUBJECTIVE AND OBJECTIVE BOX
Patient is a 73y Male whom presented to the hospital with ckd and haleigh . hypernatremia     PAST MEDICAL & SURGICAL HISTORY:  Alzheimer disease  Urinary retention  BPH (benign prostatic hyperplasia)  Constipation  HTN (hypertension)  SDH (subdural hematoma)  HARISH (obstructive sleep apnea)  GERD (gastroesophageal reflux disease)  Agitation  Dementia  ASHD (arteriosclerotic heart disease)  Gout      MEDICATIONS  (STANDING):  cyanocobalamin 500 MICROGram(s) Oral daily  dextrose 5%. 1000 milliLiter(s) (100 mL/Hr) IV Continuous <Continuous>  diVALproex  milliGRAM(s) Oral <User Schedule>  diVALproex DR 1000 milliGRAM(s) Oral at bedtime  famotidine    Tablet 20 milliGRAM(s) Oral daily  heparin  Injectable 5000 Unit(s) SubCutaneous every 12 hours  polyethylene glycol 3350 17 Gram(s) Oral two times a day  QUEtiapine 100 milliGRAM(s) Oral at bedtime  senna 2 Tablet(s) Oral at bedtime  tamsulosin 0.8 milliGRAM(s) Oral at bedtime      Allergies    No Known Allergies    Intolerances        SOCIAL HISTORY:  Denies ETOh,Smoking,     FAMILY HISTORY:  No pertinent family history in first degree relatives      REVIEW OF SYSTEMS:    unable to obtaind a good ros                                                     12.8   11.91 )-----------( 310      ( 08 Dec 2018 07:06 )             40.5       CBC Full  -  ( 08 Dec 2018 07:06 )  WBC Count : 11.91 K/uL  Hemoglobin : 12.8 g/dL  Hematocrit : 40.5 %  Platelet Count - Automated : 310 K/uL  Mean Cell Volume : 93.1 fl  Mean Cell Hemoglobin : 29.4 pg  Mean Cell Hemoglobin Concentration : 31.6 gm/dL  Auto Neutrophil # : x  Auto Lymphocyte # : x  Auto Monocyte # : x  Auto Eosinophil # : x  Auto Basophil # : x  Auto Neutrophil % : x  Auto Lymphocyte % : x  Auto Monocyte % : x  Auto Eosinophil % : x  Auto Basophil % : x      12-08    146<H>  |  108  |  14  ----------------------------<  110<H>  3.4<L>   |  28  |  1.04    Ca    8.8      08 Dec 2018 07:06        CAPILLARY BLOOD GLUCOSE          Vital Signs Last 24 Hrs  T(C): 36.8 (08 Dec 2018 13:37), Max: 37 (08 Dec 2018 08:30)  T(F): 98.3 (08 Dec 2018 13:37), Max: 98.6 (08 Dec 2018 08:30)  HR: 88 (08 Dec 2018 13:37) (87 - 99)  BP: 150/77 (08 Dec 2018 13:37) (135/80 - 152/91)  BP(mean): --  RR: 18 (08 Dec 2018 13:37) (18 - 18)  SpO2: 97% (08 Dec 2018 13:37) (94% - 98%)                PHYSICAL EXAM:    Constitutional: NAD  HEENT: conjunctive   clear   Neck:  No JVD  Respiratory: decrease bs b/l   Cardiovascular: S1 and S2  Gastrointestinal: BS+, soft, NT/ND  Extremities: No peripheral edema

## 2018-12-08 NOTE — PROGRESS NOTE ADULT - NSHPATTENDINGPLANDISCUSS_GEN_ALL_CORE
med staff , Dr King ,spoke to the wife Elaine on a phone [phone] home number ( emergency contact in a chart is not correct number )

## 2018-12-08 NOTE — BEHAVIORAL HEALTH ASSESSMENT NOTE - NSBHCONSULTMEDS_PSY_A_CORE FT
start risperdal M tab 1mg PO bid (qd and at 2pm); Seroquel not strong enough to hold agitation is delirium usually so plan is to assess resposne to M tab and then cross-taper from Seroquel (not rapidly discontinuing I as it is sedating and may be helping with sleep). continue depakote as ordered - serum level theraputic.

## 2018-12-08 NOTE — BEHAVIORAL HEALTH ASSESSMENT NOTE - SUMMARY
73 yo male with bout of acute delirium with underlying metabolic etiology, hypernatremia, and decreased oral intake juxtaposed on chronic dementia

## 2018-12-08 NOTE — BEHAVIORAL HEALTH ASSESSMENT NOTE - HPI (INCLUDE ILLNESS QUALITY, SEVERITY, DURATION, TIMING, CONTEXT, MODIFYING FACTORS, ASSOCIATED SIGNS AND SYMPTOMS)
72 yo nursing home Patient with hx of dementia admitted for  ckd and haleigh . hypernatremia and has intermittent agitation consistent with delirium.     Patient is a poor historian - unable to engage in meaningful conversation. he was seen lying in bed awake but with eyes closed and mumbling. When asked how he was doing he said " here we go" and other nonsensical replies to basic questions asked.    chart and labs reviewed

## 2018-12-08 NOTE — BEHAVIORAL HEALTH ASSESSMENT NOTE - RISK ASSESSMENT
low risk for intentional, planned out and executed harm to self or others given advanced age, physical frailty and confusion/disorientation. More likely to unintentionally/accidentally lash out at caretakers during ADL assistance or hurt self by trying to climb out of bed/pulls lines etc secondary to her chronically confused state.

## 2018-12-08 NOTE — BEHAVIORAL HEALTH ASSESSMENT NOTE - OTHER
tenuous remains with eyes closed unable to ascertain at this time deferred at this time mumbling unable to state looks confused and disoriented other patients

## 2018-12-08 NOTE — BEHAVIORAL HEALTH ASSESSMENT NOTE - NSBHCONSULTMEDSEVERE_PSY_A_CORE FT
increase PRN IM haldol to 3mg IM q4hrs prn as lower dose not enough for patient who is a larger set male with still enough muscle mass

## 2018-12-08 NOTE — PROGRESS NOTE ADULT - SUBJECTIVE AND OBJECTIVE BOX
WILLIAM CAGE [facility] S [phone] 1945   CONTACT Sp Elaine ANAYA [phone] self [phone]   ALLERGY nka   REASON FOR VISIT     Subsequent pulmonary followup  Initial evaluation/Pulmonary Critical Care consultation requested on 12/6/2018  by Dr Marcos   from Dr Brothers   Patient examined chart reviewed  HOSPITAL ADMISSION [facility] 12/6/2018    PATIENT CAME  FROM (if information available)      IVF  D5 100 (12/6) ch D5 75 (12/6)     VITALS/LABS      12/8/2018 afeb 91 150/90 18 95%   12/8/2018 W 11.9 Hb 12.8 Plt 310 Na 146 K 3.4 CO2 28 Cr 1      REVIEW OF SYMPTOMS    Able to give ROS  NO     PHYSICAL EXAM    HEENT Unremarkable PERRLA atraumatic   RESP Fair air entry EXP prolonged    Harsh breath sound Resp distres mild   CARDIAC S1 S2 No S3     NO JVD    ABDOMEN SOFT BS PRESENT NOT DISTENDED No hepatosplenomegaly PEDAL EDEMA present No calf tenderness  NO rash   GENERAL Not TOXIC looking    GLOBAL ISSUE/BEST PRACTICE:      PROBLEM: HOB elevation:   y            PROBLEM: Stress ulcer proph:    pepcid 20 (12/6)                       PROBLEM: VTE prophylaxis:      hsc (12/6)   PROBLEM: Glycemic control:    na  PROBLEM: Nutrition:    soft DASH (12/6)   PROBLEM: Advanced directive: na     PROBLEM: Allergies:  na    PATIENT DESCRIPTION PATIENT WILLIAM CAGE  12/6/2018  ADMISSION [facility] S  DR GAMALIEL STEINBERG            72 y/o M pt w/ PMHx Alzheimer's disease, subdural hematoma presents to the ED BIBA from [facility] for evaluation of agitation. Pt sent to ED by PMD for eval of agitation. Pt unable to provide hx due to pt's hx of Alzheimer's.   Pt was noted to be hypernatremic Pulm consulted 12/6/2018 because pt has HO HARISH     PROBLEM SPECIFIC PATIENT DATA  PATIENT WILLIAM CAGE  12/6/2018  ADMISSION [facility] S  DR GAMALIEL STEINBERG       DEHYDRATION HYPERNATREMIA  D5 100 (12/6)  ch D5 75 (12/6)   12/6-12/7-12/8 Na 152-149-146  HARISH   CPAP 10 (12/5) ordered empirically   12/7/2018 DW pt spouse last night She agreed that pt likely will not keep cpap on   NEUROPSYCHOTROPIC   Quetiapine 100 (12/6)   depakote 500.2 (12/6)   depakote 1000 (12/6)   Haldol 1.4p (12/6)   BPH  Tamsulosin .4 (12/6)          ASSESSMENT/RECOMMENDATIONS PATIENT WILLIAM CAGE  12/6/2018  ADMISSION [facility] S  DR GAMALIEL STEINBERG       DEHYDRATION HYPERNATREMIA  Dehydration resolving Hypernatremia improving   HARISH   Patient not using CPAP but has remained clinically stable   NEUROPSYCHOTROPIC   On meds agitation improved   BPH  Tamsulosin .4 (12/6)          TIME SPENT Over 25 minutes aggregate care time spent on encounter; activities included   direct patient care, counseling and/or coordinating care reviewing notes, lab data/ imaging , discussion with multidisciplinary team/ patient  /family. Risks, benefits, alternatives  discussed in detail.

## 2018-12-08 NOTE — PROGRESS NOTE ADULT - SUBJECTIVE AND OBJECTIVE BOX
PROGRESS NOTE  Patient is a 73y old  Male who presents with a chief complaint of poor po intake and lethargy (08 Dec 2018 10:18)  Chart and available morning labs /imaging are reviewed electronically , urgent issues addressed . More information  is being added upon completion of rounds , when more information is collected and management discussed with consultants , medical staff and social service/case management on the floor   OVERNIGHT  Agitation was  reported by medical staff ,haldol was increased . All above noted Patient is resting in a bed comfortably .Confused ,poor mentation .No distress noted   Psychiatry consult is pending , one [facility] called to remind about request   HPI:  74 yo AAM with hx of ALZHEIMER'S DISEASE ,SUBDURAL HEMATOMA, HTN, CAD ,GERD , HARISH ,VIT B12 DEFICIENCY ,GOUT ,CONSTIPATION .brought to ED from [facility] due to poor po intake and lethargy ,worsening for 3 days . Patient refused iv line placement and labs due to severe agitation and dementia ,in [facility] he was  combative and aggressive with medical staff and psychiatric evaluation is  requested Admit for iv hydration, monitor renal profile and urine output ,nutritionist consult ,prealbumin level ,serial bmp, nutritional supplements, multivitamins ,palliative care evaluation  regarding MOLST completion and artificial nutrition discussion . (06 Dec 2018 15:20)  PAST MEDICAL & SURGICAL HISTORY:  Alzheimer disease  Urinary retention  BPH (benign prostatic hyperplasia)  Constipation  HTN (hypertension)  SDH (subdural hematoma)  HARISH (obstructive sleep apnea)  GERD (gastroesophageal reflux disease)  Agitation  Dementia  ASHD (arteriosclerotic heart disease)  Gout  MEDICATIONS  (STANDING):  cyanocobalamin 500 MICROGram(s) Oral daily  dextrose 5%. 1000 milliLiter(s) (75 mL/Hr) IV Continuous <Continuous>  diVALproex  milliGRAM(s) Oral <User Schedule>  diVALproex DR 1000 milliGRAM(s) Oral at bedtime  famotidine    Tablet 20 milliGRAM(s) Oral daily  heparin  Injectable 5000 Unit(s) SubCutaneous every 12 hours  polyethylene glycol 3350 17 Gram(s) Oral two times a day  potassium chloride  10 mEq/100 mL IVPB 10 milliEquivalent(s) IV Intermittent every 1 hour  QUEtiapine 100 milliGRAM(s) Oral at bedtime  senna 2 Tablet(s) Oral at bedtime  tamsulosin 0.8 milliGRAM(s) Oral at bedtime  MEDICATIONS  (PRN):  acetaminophen   Tablet .. 650 milliGRAM(s) Oral every 6 hours PRN Temp greater or equal to 38C (100.4F), Mild Pain (1 - 3)  haloperidol    Injectable 1 milliGRAM(s) IntraMuscular every 6 hours PRN Agitation  traMADol 25 milliGRAM(s) Oral every 6 hours PRN Moderate Pain (4 - 6)  OBJECTIVE  T(C): 37 (12-08-18 @ 08:30), Max: 37 (12-08-18 @ 08:30)  HR: 87 (12-08-18 @ 08:30) (87 - 99)  BP: 135/80 (12-08-18 @ 08:30) (135/80 - 152/91)  RR: 18 (12-08-18 @ 08:30) (18 - 18)  SpO2: 98% (12-08-18 @ 08:30) (94% - 98%)  Wt(kg): --  I&O's Summary  07 Dec 2018 07:01  -  08 Dec 2018 07:00  --------------------------------------------------------  IN: 1425 mL / OUT: 0 mL / NET: 1425 mL  REVIEW OF SYSTEMS:  CONSTITUTIONAL: No fever, weight loss, or fatigue  EYES: No eye pain, visual disturbances, or discharge  ENMT:   No sinus or throat pain  NECK: No pain or stiffness  RESPIRATORY: No cough, wheezing, chills or hemoptysis; No shortness of breath  CARDIOVASCULAR: No chest pain, palpitations, dizziness, or leg swelling  GASTROINTESTINAL: No abdominal pain. No nausea, vomiting; No diarrhea or constipation. No melena or hematochezia.  GENITOURINARY: No dysuria, frequency, hematuria, or incontinence  NEUROLOGICAL: No headaches, memory loss, loss of strength, numbness, or tremors  SKIN: No itching, burning, rashes, or lesions   MUSCULOSKELETAL: No joint pain or swelling; No muscle, back, or extremity pain  PHYSICAL EXAM:  Appearance: NAD. VS past 24 hrs -as above   HEENT:   Moist oral mucosa. Conjunctiva clear b/l.   Neck : supple< from: Xray Chest 1 View- PORTABLE-Urgent (12.06.18 @ 13:24) >  EXAM:  XR CHEST PORTABLE URGENT 1V                        PROCEDURE DATE:  12/06/2018    INTERPRETATION:  CLINICAL STATEMENT: Chest pain.  TECHNIQUE: AP view of the chest.  COMPARISON: None available.  FINDINGS/  IMPRESSION:  No consolidation or pleural effusion.  Heart size within normal limits.  < end of copied text >  Respiratory: Lungs CTAB.  Gastrointestinal:  Soft, nontender. No rebound. No rigidity. BS present	  Cardiovascular: RRR ,S1S2 present  Neurologic: Non-focal. Moving all extremities.  Extremities: No edema. No erythema. No calf tenderness.  Skin: No rashes, No ecchymoses, No cyanosis.	  wounds ,skin lesions-See skin assesment flow sheet   LABS:                        12.8   11.91 )-----------( 310      ( 08 Dec 2018 07:06 )             40.5     12-08  146<H>  |  108  |  14  ----------------------------<  110<H>  3.4<L>   |  28  |  1.04  Ca    8.8      08 Dec 2018 07:06  TPro  7.7  /  Alb  2.4<L>  /  TBili  0.7  /  DBili  x   /  AST  25  /  ALT  39  /  AlkPhos  55  12-06  CAPILLARY BLOOD GLUCOSE  PT/INR - ( 06 Dec 2018 13:42 )   PT: 14.3 sec;   INR: 1.30 ratio     PTT - ( 06 Dec 2018 13:42 )  PTT:28.5 sec  RADIOLOGY & ADDITIONAL TESTS:   reviewed elctronically  ASSESSMENT/PLAN:

## 2018-12-08 NOTE — BEHAVIORAL HEALTH ASSESSMENT NOTE - AXIS III
acute -  ckd and haleigh . hypernatremia; Alzheimer disease; ASHD (arteriosclerotic heart disease); BPH (benign prostatic hyperplasia); Constipation; Dementia; GERD (gastroesophageal reflux disease); Gout ; HTN (hypertension); HARIHS (obstructive sleep apnea); SDH (subdural hematoma); Urinary retention.

## 2018-12-08 NOTE — PROGRESS NOTE ADULT - SUBJECTIVE AND OBJECTIVE BOX
Neurology follow up note    NILES [id number]      Interval History:    Patient resting in bed received  haldol last night     MEDICATIONS    acetaminophen   Tablet .. 650 milliGRAM(s) Oral every 6 hours PRN  cyanocobalamin 500 MICROGram(s) Oral daily  dextrose 5%. 1000 milliLiter(s) IV Continuous <Continuous>  diVALproex  milliGRAM(s) Oral <User Schedule>  diVALproex DR 1000 milliGRAM(s) Oral at bedtime  famotidine    Tablet 20 milliGRAM(s) Oral daily  haloperidol    Injectable 1 milliGRAM(s) IntraMuscular every 6 hours PRN  heparin  Injectable 5000 Unit(s) SubCutaneous every 12 hours  polyethylene glycol 3350 17 Gram(s) Oral two times a day  QUEtiapine 100 milliGRAM(s) Oral at bedtime  senna 2 Tablet(s) Oral at bedtime  tamsulosin 0.8 milliGRAM(s) Oral at bedtime  traMADol 25 milliGRAM(s) Oral every 6 hours PRN      Allergies    No Known Allergies    Intolerances            Vital Signs Last 24 Hrs  T(C): 36.4 (08 Dec 2018 04:45), Max: 37.7 (07 Dec 2018 09:12)  T(F): 97.6 (08 Dec 2018 04:45), Max: 99.9 (07 Dec 2018 09:12)  HR: 91 (08 Dec 2018 04:45) (79 - 99)  BP: 152/91 (08 Dec 2018 04:45) (138/72 - 152/91)  BP(mean): --  RR: 18 (08 Dec 2018 04:45) (18 - 18)  SpO2: 95% (08 Dec 2018 04:45) (93% - 95%)    REVIEW OF SYSTEMS: Limited or unable to obtain secondary to patient's poor mental status.        On Neurological Examination:  The patient is arousable to verbal stimuli, opens eyes.    Extraocular movements were intact.  The patient will say irrelevant things.    Speech was fluent.  No dysarthria noted.    Motor, bilateral upper appeared to be 4/5.    Bilateral lower, plantar stimulation and flexation of the hip and knee was 3/5.    Did not notice any gross focality.    Exam is limited secondary to the patient not following commands.    Does not follow commands    GENERAL Exam: Nontoxic , No Acute Distress   	  HEENT:  normocephalic, atraumatic  		  LUNGS: Clear bilaterally    	  HEART: Normal S1S2   No murmur RRR        	  GI/ ABDOMEN:  Soft  Non tender    EXTREMITIES:   No Edema  No Clubbing  No Cyanosis     MUSCULOSKELETAL: decreased Range of Motion all 4 extremities   	   SKIN: Normal  No Ecchymosis               LABS:  CBC Full  -  ( 08 Dec 2018 07:06 )  WBC Count : 11.91 K/uL  Hemoglobin : 12.8 g/dL  Hematocrit : 40.5 %  Platelet Count - Automated : 310 K/uL  Mean Cell Volume : 93.1 fl  Mean Cell Hemoglobin : 29.4 pg  Mean Cell Hemoglobin Concentration : 31.6 gm/dL  Auto Neutrophil # : x  Auto Lymphocyte # : x  Auto Monocyte # : x  Auto Eosinophil # : x  Auto Basophil # : x  Auto Neutrophil % : x  Auto Lymphocyte % : x  Auto Monocyte % : x  Auto Eosinophil % : x  Auto Basophil % : x      12-08    146<H>  |  108  |  14  ----------------------------<  110<H>  3.4<L>   |  28  |  1.04    Ca    8.8      08 Dec 2018 07:06    TPro  7.7  /  Alb  2.4<L>  /  TBili  0.7  /  DBili  x   /  AST  25  /  ALT  39  /  AlkPhos  55  12-06    Hemoglobin A1C:     LIVER FUNCTIONS - ( 06 Dec 2018 13:42 )  Alb: 2.4 g/dL / Pro: 7.7 g/dL / ALK PHOS: 55 U/L / ALT: 39 U/L DA / AST: 25 U/L / GGT: x           Vitamin B12   PT/INR - ( 06 Dec 2018 13:42 )   PT: 14.3 sec;   INR: 1.30 ratio         PTT - ( 06 Dec 2018 13:42 )  PTT:28.5 sec      RADIOLOGY      ANALYSIS AND PLAN:  A 73-year-old with episode of change in the mental status, history of subdural hematoma.  1.	For change in the mental status, suspect most likely secondary to progressive dementia along with underlying metabolic etiology, hypernatremia, and decreased oral intake.  Examination was limited but I do not see any clear signs to suggest new cerebrovascular accident had ensued.  2.	I would recommend IV fluid hydration.  3.	Monitor oral intake.  4.	Monitor sodium levels.  5.	For history of dementia, the patient did try medications in the past but appeared to have side effects secondary to the patient's age and it appeared to be advanced, did not feel that medications would be of any benefit.  6.	For history of subdural hematoma, as per my conversation with spouse, his last CT imaging showed had resolution of it.  7.	Spoke with spouse Elaine at bedside, her telephone number is [phone]. 12/7/18  She understands while in the hospital setting may become more disoriented.  8.	For agitation, continue the patient on his Depakote and Seroquel.  Haldol was added.  Make adjustments to medications as needed. Patient on Depakote as a mood stabilizer not for seizures ok to bojorquez to alt mood stabilizer   9.	psych follow up     Thank you for the courtesy of consultation.    Physical therapy evaluation as tolerated  OOB to chair/ambulation with assistance only if possible.    Greater than 45 minutes spent in direct patient care reviewing  the notes, lab data/ imaging , discussion with multidisciplinary team.

## 2018-12-09 LAB
ANION GAP SERPL CALC-SCNC: 7 MMOL/L — SIGNIFICANT CHANGE UP (ref 5–17)
BUN SERPL-MCNC: 11 MG/DL — SIGNIFICANT CHANGE UP (ref 7–23)
CALCIUM SERPL-MCNC: 8.6 MG/DL — SIGNIFICANT CHANGE UP (ref 8.4–10.5)
CHLORIDE SERPL-SCNC: 108 MMOL/L — SIGNIFICANT CHANGE UP (ref 96–108)
CO2 SERPL-SCNC: 31 MMOL/L — SIGNIFICANT CHANGE UP (ref 22–31)
CREAT SERPL-MCNC: 0.99 MG/DL — SIGNIFICANT CHANGE UP (ref 0.5–1.3)
GLUCOSE SERPL-MCNC: 104 MG/DL — HIGH (ref 70–99)
HCT VFR BLD CALC: 39.4 % — SIGNIFICANT CHANGE UP (ref 39–50)
HGB BLD-MCNC: 12.8 G/DL — LOW (ref 13–17)
MCHC RBC-ENTMCNC: 30.1 PG — SIGNIFICANT CHANGE UP (ref 27–34)
MCHC RBC-ENTMCNC: 32.5 GM/DL — SIGNIFICANT CHANGE UP (ref 32–36)
MCV RBC AUTO: 92.7 FL — SIGNIFICANT CHANGE UP (ref 80–100)
NRBC # BLD: 0 /100 WBCS — SIGNIFICANT CHANGE UP (ref 0–0)
PLATELET # BLD AUTO: 294 K/UL — SIGNIFICANT CHANGE UP (ref 150–400)
POTASSIUM SERPL-MCNC: 4.2 MMOL/L — SIGNIFICANT CHANGE UP (ref 3.5–5.3)
POTASSIUM SERPL-SCNC: 4.2 MMOL/L — SIGNIFICANT CHANGE UP (ref 3.5–5.3)
RBC # BLD: 4.25 M/UL — SIGNIFICANT CHANGE UP (ref 4.2–5.8)
RBC # FLD: 13.1 % — SIGNIFICANT CHANGE UP (ref 10.3–14.5)
SODIUM SERPL-SCNC: 146 MMOL/L — HIGH (ref 135–145)
WBC # BLD: 11.99 K/UL — HIGH (ref 3.8–10.5)
WBC # FLD AUTO: 11.99 K/UL — HIGH (ref 3.8–10.5)

## 2018-12-09 PROCEDURE — 99233 SBSQ HOSP IP/OBS HIGH 50: CPT

## 2018-12-09 RX ADMIN — SENNA PLUS 2 TABLET(S): 8.6 TABLET ORAL at 21:08

## 2018-12-09 RX ADMIN — FAMOTIDINE 20 MILLIGRAM(S): 10 INJECTION INTRAVENOUS at 11:59

## 2018-12-09 RX ADMIN — SODIUM CHLORIDE 50 MILLILITER(S): 9 INJECTION, SOLUTION INTRAVENOUS at 08:01

## 2018-12-09 RX ADMIN — HEPARIN SODIUM 5000 UNIT(S): 5000 INJECTION INTRAVENOUS; SUBCUTANEOUS at 05:16

## 2018-12-09 RX ADMIN — TAMSULOSIN HYDROCHLORIDE 0.8 MILLIGRAM(S): 0.4 CAPSULE ORAL at 21:08

## 2018-12-09 RX ADMIN — RISPERIDONE 1 MILLIGRAM(S): 4 TABLET ORAL at 08:01

## 2018-12-09 RX ADMIN — HALOPERIDOL DECANOATE 3 MILLIGRAM(S): 100 INJECTION INTRAMUSCULAR at 14:45

## 2018-12-09 RX ADMIN — RISPERIDONE 1 MILLIGRAM(S): 4 TABLET ORAL at 13:06

## 2018-12-09 RX ADMIN — QUETIAPINE FUMARATE 100 MILLIGRAM(S): 200 TABLET, FILM COATED ORAL at 21:08

## 2018-12-09 RX ADMIN — HEPARIN SODIUM 5000 UNIT(S): 5000 INJECTION INTRAVENOUS; SUBCUTANEOUS at 17:42

## 2018-12-09 RX ADMIN — PREGABALIN 500 MICROGRAM(S): 225 CAPSULE ORAL at 11:59

## 2018-12-09 RX ADMIN — DIVALPROEX SODIUM 1000 MILLIGRAM(S): 500 TABLET, DELAYED RELEASE ORAL at 21:08

## 2018-12-09 RX ADMIN — DIVALPROEX SODIUM 500 MILLIGRAM(S): 500 TABLET, DELAYED RELEASE ORAL at 08:01

## 2018-12-09 RX ADMIN — DIVALPROEX SODIUM 500 MILLIGRAM(S): 500 TABLET, DELAYED RELEASE ORAL at 13:07

## 2018-12-09 NOTE — PROGRESS NOTE BEHAVIORAL HEALTH - AXIS III
acute -  ckd and haleigh . hypernatremia; Alzheimer disease; ASHD (arteriosclerotic heart disease); BPH (benign prostatic hyperplasia); Constipation; Dementia; GERD (gastroesophageal reflux disease); Gout ; HTN (hypertension); HARISH (obstructive sleep apnea); SDH (subdural hematoma); Urinary retention.

## 2018-12-09 NOTE — PROGRESS NOTE ADULT - SUBJECTIVE AND OBJECTIVE BOX
Neurology follow up note    NILES [id number]      Interval History:    Patient resting in bed     MEDICATIONS    acetaminophen   Tablet .. 650 milliGRAM(s) Oral every 6 hours PRN  cyanocobalamin 500 MICROGram(s) Oral daily  dextrose 5%. 1000 milliLiter(s) IV Continuous <Continuous>  diVALproex  milliGRAM(s) Oral <User Schedule>  diVALproex DR 1000 milliGRAM(s) Oral at bedtime  famotidine    Tablet 20 milliGRAM(s) Oral daily  haloperidol    Injectable 3 milliGRAM(s) IntraMuscular every 4 hours PRN  heparin  Injectable 5000 Unit(s) SubCutaneous every 12 hours  polyethylene glycol 3350 17 Gram(s) Oral two times a day  QUEtiapine 100 milliGRAM(s) Oral at bedtime  risperiDONE  Disintegrating Tablet 1 milliGRAM(s) Oral daily  risperiDONE  Disintegrating Tablet 1 milliGRAM(s) Oral <User Schedule>  senna 2 Tablet(s) Oral at bedtime  tamsulosin 0.8 milliGRAM(s) Oral at bedtime  traMADol 25 milliGRAM(s) Oral every 6 hours PRN      Allergies    No Known Allergies    Intolerances            Vital Signs Last 24 Hrs  T(C): 36.6 (09 Dec 2018 05:08), Max: 37 (08 Dec 2018 21:24)  T(F): 97.9 (09 Dec 2018 05:08), Max: 98.6 (08 Dec 2018 21:24)  HR: 90 (09 Dec 2018 05:08) (78 - 110)  BP: 127/83 (09 Dec 2018 05:08) (99/61 - 150/77)  BP(mean): --  RR: 17 (09 Dec 2018 05:08) (17 - 18)  SpO2: 100% (09 Dec 2018 05:08) (95% - 100%)      REVIEW OF SYSTEMS: Limited or unable to obtain secondary to patient's poor mental status.        On Neurological Examination:  The patient is arousable to verbal stimuli, opens eyes.    Extraocular movements were intact.  The patient will say irrelevant things.    Speech was fluent.  No dysarthria noted.    Motor, bilateral upper appeared to be 4/5.    Bilateral lower, plantar stimulation and flexation of the hip and knee was 3/5.    Did not notice any gross focality.    Exam is limited secondary to the patient not following commands.    Does not follow commands    GENERAL Exam: Nontoxic , No Acute Distress   	  HEENT:  normocephalic, atraumatic  		  LUNGS: Clear bilaterally    	  HEART: Normal S1S2   No murmur RRR        	  GI/ ABDOMEN:  Soft  Non tender    EXTREMITIES:   No Edema  No Clubbing  No Cyanosis     MUSCULOSKELETAL: decreased Range of Motion all 4 extremities   	   SKIN: Normal  No Ecchymosis                  LABS:  CBC Full  -  ( 09 Dec 2018 08:12 )  WBC Count : 11.99 K/uL  Hemoglobin : 12.8 g/dL  Hematocrit : 39.4 %  Platelet Count - Automated : 294 K/uL  Mean Cell Volume : 92.7 fl  Mean Cell Hemoglobin : 30.1 pg  Mean Cell Hemoglobin Concentration : 32.5 gm/dL  Auto Neutrophil # : x  Auto Lymphocyte # : x  Auto Monocyte # : x  Auto Eosinophil # : x  Auto Basophil # : x  Auto Neutrophil % : x  Auto Lymphocyte % : x  Auto Monocyte % : x  Auto Eosinophil % : x  Auto Basophil % : x      12-09    146<H>  |  108  |  11  ----------------------------<  104<H>  4.2   |  31  |  0.99    Ca    8.6      09 Dec 2018 08:12      Hemoglobin A1C:       Vitamin B12         RADIOLOGY        ANALYSIS AND PLAN:  A 73-year-old with episode of change in the mental status, history of subdural hematoma.  1.	For change in the mental status, suspect most likely secondary to progressive dementia along with underlying metabolic etiology, hypernatremia, and decreased oral intake.  Examination was limited but I do not see any clear signs to suggest new cerebrovascular accident had ensued.  2.	I would recommend IV fluid hydration.  3.	Monitor oral intake.  4.	Monitor sodium levels.  5.	For history of dementia, the patient did try medications in the past but appeared to have side effects secondary to the patient's age and it appeared to be advanced, did not feel that medications would be of any benefit.  6.	For history of subdural hematoma, as per my conversation with spouse, his last CT imaging showed had resolution of it.  7.	Spoke with spouse Elaine at bedside, her telephone number is [phone]. 12/7/18  She understands while in the hospital setting may become more disoriented.  8.	For agitation, continue the patient on his Depakote and Seroquel.  Haldol was added.  Make adjustments to medications as needed. Patient on Depakote as a mood stabilizer not for seizures ok to bojorquez to alt mood stabilizer   9.	psych follow up     Thank you for the courtesy of consultation.    Physical therapy evaluation as tolerated  OOB to chair/ambulation with assistance only if possible.    Greater than 40 minutes spent in direct patient care reviewing  the notes, lab data/ imaging , discussion with multidisciplinary team.

## 2018-12-09 NOTE — PROGRESS NOTE BEHAVIORAL HEALTH - OTHER
deferred at this time mumbling unable to state sleeping peacefully unable to ascertain at this time sleeping n/a - sleeping

## 2018-12-09 NOTE — PROGRESS NOTE BEHAVIORAL HEALTH - NSBHFUPINTERVALHXFT_PSY_A_CORE
Chart and labs reviewed. Patient sleeping in his bed this morning and does not have the psychomotor agitation as he exhibited yesterday; seems to be tolerating the risperdal and doing better on the increased dose of the haldol IM PRN.

## 2018-12-09 NOTE — PROGRESS NOTE BEHAVIORAL HEALTH - NSBHCONSULTMEDS_PSY_A_CORE FT
continue risperdal M tab 1mg PO bid (qd and at 2pm); Seroquel not strong enough to hold agitation is delirium usually so plan is to assess resposne to M tab and then cross-taper from Seroquel (not rapidly discontinuing I as it is sedating and may be helping with sleep). continue depakote as ordered - serum level theraputic.

## 2018-12-09 NOTE — PROGRESS NOTE ADULT - SUBJECTIVE AND OBJECTIVE BOX
WILLIAM CAGE [facility] S [phone] 1945   CONTACT Sp Elaine ANAYA [phone] self [phone]   ALLERGY nka   REASON FOR VISIT     Subsequent pulmonary followup  Initial evaluation/Pulmonary Critical Care consultation requested on 12/6/2018  by Dr Marcos   from Dr Brothers   Patient examined chart reviewed  HOSPITAL ADMISSION [facility] S 12/6/2018    PATIENT CAME  FROM (if information available)      IVF  D5 100 (12/6) ch D5 75 (12/6) ch D5 50 (12/9)    VITALS/LABS      12/9/2018 afeb 87 125/68 18 97%   12/9/2018 W 11.9 Hb 12.8 Plt 294 Na 146 K 4.2 CO2 31 Cr .9     REVIEW OF SYMPTOMS    Able to give ROS  NO     PHYSICAL EXAM    HEENT Unremarkable PERRLA atraumatic   RESP Fair air entry EXP prolonged    Harsh breath sound Resp distres mild   CARDIAC S1 S2 No S3     NO JVD    ABDOMEN SOFT BS PRESENT NOT DISTENDED No hepatosplenomegaly PEDAL EDEMA present No calf tenderness  NO rash   GENERAL Not TOXIC looking    GLOBAL ISSUE/BEST PRACTICE:      PROBLEM: HOB elevation:   y            PROBLEM: Stress ulcer proph:    pepcid 20 (12/6)                       PROBLEM: VTE prophylaxis:      hsc (12/6)   PROBLEM: Glycemic control:    na  PROBLEM: Nutrition:    soft DASH (12/6)   PROBLEM: Advanced directive: na     PROBLEM: Allergies:  na    PATIENT DESCRIPTION PATIENT WILLIAM CAGE  12/6/2018  ADMISSION [facility] S  DR GAMALIEL STEINBERG            72 y/o M pt w/ PMHx Alzheimer's disease, subdural hematoma presents to the ED BIBA from [facility] for evaluation of agitation. Pt sent to ED by PMD for eval of agitation. admitted 12/6/2018Pt unable to provide hx due to pt's hx of Alzheimer's.   Pt was noted to be hypernatremic Pulm consulted 12/6/2018 because pt has HO HARISH     PROBLEM SPECIFIC PATIENT DATA  PATIENT WILLIAM CAGE  12/6/2018  ADMISSION [facility] S  DR GAMALIEL STEINBERG       DEHYDRATION HYPERNATREMIA  D5 100 (12/6)  ch D5 75 (12/6)   12/6-12/7-12/8-12/9 Na [phone]  HARISH   CPAP 10 (12/5) ordered empirically   12/7/2018 DW pt spouse last night She agreed that pt likely will not keep cpap on   NEUROPSYCHOTROPIC   Quetiapine 100 (12/6)   depakote 500.2 (12/6)   depakote 1000 (12/6)   Haldol 1.4p (12/6)   BPH  Tamsulosin .4 (12/6)          ASSESSMENT/RECOMMENDATIONS PATIENT WILLIAM CAGE  12/6/2018  ADMISSION [facility] S  DR GAMALIEL STEINBERG       DEHYDRATION HYPERNATREMIA  Dehydration resolving Hypernatremia improving   HARISH   Patient not using CPAP but has remained clinically stable   NEUROPSYCHOTROPIC   On meds agitation improved   BPH  Tamsulosin .4 (12/6)          TIME SPENT Over 25 minutes aggregate care time spent on encounter; activities included   direct patient care, counseling and/or coordinating care reviewing notes, lab data/ imaging , discussion with multidisciplinary team/ patient  /family. Risks, benefits, alternatives  discussed in detail.

## 2018-12-09 NOTE — PROGRESS NOTE ADULT - SUBJECTIVE AND OBJECTIVE BOX
Patient is a 73y Male with a known history of :  Delirium due to multiple etiologies, acute, hyperactive (F05)  Hypertension, unspecified type (I10)  Gout (M10.9)  VIRI (acute kidney injury) (N17.9)  Prophylactic measure (Z29.9)  Agitation (R45.1)  Dementia (F03.90)  CAD (coronary artery disease) (I25.10)  Dehydration (E86.0)  Hypernatremia (E87.0)    HPI:  74 yo AAM with hx of ALZHEIMER'S DISEASE ,SUBDURAL HEMATOMA, HTN, CAD ,GERD , HARISH ,VIT B12 DEFICIENCY ,GOUT ,CONSTIPATION .brought to ED from [facility] due to poor po intake and lethargy ,worsening for 3 days . Patient refused iv line placement and labs due to severe agitation and dementia ,in [facility] he was  combative and aggressive with medical staff and psychiatric evaluation is  requested Admit for iv hydration, monitor renal profile and urine output ,nutritionist consult ,prealbumin level ,serial bmp, nutritional supplements, multivitamins ,palliative care evaluation  regarding MOLST completion and artificial nutrition discussion . (06 Dec 2018 15:20)      REVIEW OF SYSTEMS:    CONSTITUTIONAL: No fever, weight loss, or fatigue  EYES: No eye pain, visual disturbances, or discharge  ENMT:  No difficulty hearing, tinnitus, vertigo; No sinus or throat pain  NECK: No pain or stiffness  BREASTS: No pain, masses, or nipple discharge  RESPIRATORY: No cough, wheezing, chills or hemoptysis; No shortness of breath  CARDIOVASCULAR: No chest pain, palpitations, dizziness, or leg swelling  GASTROINTESTINAL: No abdominal or epigastric pain. No nausea, vomiting, or hematemesis; No diarrhea or constipation. No melena or hematochezia.  GENITOURINARY: No dysuria, frequency, hematuria, or incontinence  NEUROLOGICAL: No headaches, memory loss, loss of strength, numbness, or tremors  SKIN: No itching, burning, rashes, or lesions   LYMPH NODES: No enlarged glands  ENDOCRINE: No heat or cold intolerance; No hair loss  MUSCULOSKELETAL: No joint pain or swelling; No muscle, back, or extremity pain  PSYCHIATRIC: No depression, anxiety, mood swings, or difficulty sleeping  HEME/LYMPH: No easy bruising, or bleeding gums  ALLERGY AND IMMUNOLOGIC: No hives or eczema    MEDICATIONS  (STANDING):  cyanocobalamin 500 MICROGram(s) Oral daily  dextrose 5%. 1000 milliLiter(s) (50 mL/Hr) IV Continuous <Continuous>  diVALproex  milliGRAM(s) Oral <User Schedule>  diVALproex DR 1000 milliGRAM(s) Oral at bedtime  famotidine    Tablet 20 milliGRAM(s) Oral daily  heparin  Injectable 5000 Unit(s) SubCutaneous every 12 hours  polyethylene glycol 3350 17 Gram(s) Oral two times a day  QUEtiapine 100 milliGRAM(s) Oral at bedtime  risperiDONE  Disintegrating Tablet 1 milliGRAM(s) Oral daily  risperiDONE  Disintegrating Tablet 1 milliGRAM(s) Oral <User Schedule>  senna 2 Tablet(s) Oral at bedtime  tamsulosin 0.8 milliGRAM(s) Oral at bedtime    MEDICATIONS  (PRN):  acetaminophen   Tablet .. 650 milliGRAM(s) Oral every 6 hours PRN Temp greater or equal to 38C (100.4F), Mild Pain (1 - 3)  haloperidol    Injectable 3 milliGRAM(s) IntraMuscular every 4 hours PRN severe agitation like pulling lines  traMADol 25 milliGRAM(s) Oral every 6 hours PRN Moderate Pain (4 - 6)      ALLERGIES: No Known Allergies      FAMILY HISTORY:  No pertinent family history in first degree relatives      PHYSICAL EXAMINATION:  -----------------------------  T(C): 36.6 (12-09-18 @ 05:08), Max: 37 (12-08-18 @ 21:24)  HR: 90 (12-09-18 @ 05:08) (78 - 110)  BP: 127/83 (12-09-18 @ 05:08) (99/61 - 150/77)  RR: 17 (12-09-18 @ 05:08) (17 - 18)  SpO2: 100% (12-09-18 @ 05:08) (95% - 100%)  Wt(kg): --        Constitutional: well developed, normal appearance, well groomed, well nourished, no deformities and no acute distress.   Eyes: the conjunctiva exhibited no abnormalities and the eyelids demonstrated no xanthelasmas.   HEENT: normal oral mucosa, no oral pallor and no oral cyanosis.   Neck: normal jugular venous A waves present, normal jugular venous V waves present and no jugular venous swanson A waves.   Pulmonary: no respiratory distress, normal respiratory rhythm and effort, no accessory muscle use and lungs were clear to auscultation bilaterally.   Cardiovascular: heart rate and rhythm were normal, normal S1 and S2 and no murmur, gallop, rub, heave or thrill are present.   Abdomen: soft, non-tender, no hepato-splenomegaly and no abdominal mass palpated.   Musculoskeletal: the gait could not be assessed..   Extremities: no clubbing of the fingernails, no localized cyanosis, no petechial hemorrhages and no ischemic changes.   Skin: normal skin color and pigmentation, no rash, no venous stasis, no skin lesions, no skin ulcer and no xanthoma was observed.   Psychiatric: oriented to person, place, and time, the affect was normal, the mood was normal and not feeling anxious.     LABS:   --------  12-09    146<H>  |  108  |  11  ----------------------------<  104<H>  4.2   |  31  |  0.99    Ca    8.6      09 Dec 2018 08:12                           12.8   11.99 )-----------( 294      ( 09 Dec 2018 08:12 )             39.4                 RADIOLOGY:  -----------------        ECG:

## 2018-12-09 NOTE — PROGRESS NOTE ADULT - SUBJECTIVE AND OBJECTIVE BOX
Patient is a 73y Male whom presented to the hospital with ckd and haleigh . hypernatremia     PAST MEDICAL & SURGICAL HISTORY:  Alzheimer disease  Urinary retention  BPH (benign prostatic hyperplasia)  Constipation  HTN (hypertension)  SDH (subdural hematoma)  HARISH (obstructive sleep apnea)  GERD (gastroesophageal reflux disease)  Agitation  Dementia  ASHD (arteriosclerotic heart disease)  Gout      MEDICATIONS  (STANDING):  cyanocobalamin 500 MICROGram(s) Oral daily  dextrose 5%. 1000 milliLiter(s) (100 mL/Hr) IV Continuous <Continuous>  diVALproex  milliGRAM(s) Oral <User Schedule>  diVALproex DR 1000 milliGRAM(s) Oral at bedtime  famotidine    Tablet 20 milliGRAM(s) Oral daily  heparin  Injectable 5000 Unit(s) SubCutaneous every 12 hours  polyethylene glycol 3350 17 Gram(s) Oral two times a day  QUEtiapine 100 milliGRAM(s) Oral at bedtime  senna 2 Tablet(s) Oral at bedtime  tamsulosin 0.8 milliGRAM(s) Oral at bedtime      Allergies    No Known Allergies    Intolerances        SOCIAL HISTORY:  Denies ETOh,Smoking,     FAMILY HISTORY:  No pertinent family history in first degree relatives      REVIEW OF SYSTEMS:    unable to obtaind a good ros                                                                             12.8   11.99 )-----------( 294      ( 09 Dec 2018 08:12 )             39.4       CBC Full  -  ( 09 Dec 2018 08:12 )  WBC Count : 11.99 K/uL  Hemoglobin : 12.8 g/dL  Hematocrit : 39.4 %  Platelet Count - Automated : 294 K/uL  Mean Cell Volume : 92.7 fl  Mean Cell Hemoglobin : 30.1 pg  Mean Cell Hemoglobin Concentration : 32.5 gm/dL  Auto Neutrophil # : x  Auto Lymphocyte # : x  Auto Monocyte # : x  Auto Eosinophil # : x  Auto Basophil # : x  Auto Neutrophil % : x  Auto Lymphocyte % : x  Auto Monocyte % : x  Auto Eosinophil % : x  Auto Basophil % : x      12-09    146<H>  |  108  |  11  ----------------------------<  104<H>  4.2   |  31  |  0.99    Ca    8.6      09 Dec 2018 08:12        CAPILLARY BLOOD GLUCOSE          Vital Signs Last 24 Hrs  T(C): 36.8 (09 Dec 2018 10:19), Max: 37 (08 Dec 2018 21:24)  T(F): 98.2 (09 Dec 2018 10:19), Max: 98.6 (08 Dec 2018 21:24)  HR: 87 (09 Dec 2018 10:19) (78 - 110)  BP: 125/68 (09 Dec 2018 10:19) (99/61 - 150/77)  BP(mean): --  RR: 18 (09 Dec 2018 10:19) (17 - 18)  SpO2: 97% (09 Dec 2018 10:19) (95% - 100%)                      PHYSICAL EXAM:    Constitutional: NAD  HEENT: conjunctive   clear   Neck:  No JVD  Respiratory: decrease bs b/l   Cardiovascular: S1 and S2  Gastrointestinal: BS+, soft, NT/ND  Extremities: No peripheral edema

## 2018-12-09 NOTE — PROGRESS NOTE ADULT - PROBLEM SELECTOR PLAN 1
hypernatremia continue with d5w 50 cc per hr   ACUTE RENAL FAILURE: due  decrease fluid intake continue with fluid   Serum creatinine is improving     , approximating GFR is improved    ml/min.   There is  progression . No uremic symptoms    No evidence of anemia .  Fluid status stable.  Will continue to avoid nephrotoxic drugs.  Patient remains asymptomatic.   Continue current therapy.

## 2018-12-10 DIAGNOSIS — R41.82 ALTERED MENTAL STATUS, UNSPECIFIED: ICD-10-CM

## 2018-12-10 RX ORDER — VALPROIC ACID (AS SODIUM SALT) 250 MG/5ML
500 SOLUTION, ORAL ORAL
Refills: 0 | Status: DISCONTINUED | OUTPATIENT
Start: 2018-12-10 | End: 2018-12-14

## 2018-12-10 RX ORDER — VALPROIC ACID (AS SODIUM SALT) 250 MG/5ML
1000 SOLUTION, ORAL ORAL AT BEDTIME
Refills: 0 | Status: DISCONTINUED | OUTPATIENT
Start: 2018-12-10 | End: 2018-12-14

## 2018-12-10 RX ADMIN — HEPARIN SODIUM 5000 UNIT(S): 5000 INJECTION INTRAVENOUS; SUBCUTANEOUS at 07:22

## 2018-12-10 RX ADMIN — HEPARIN SODIUM 5000 UNIT(S): 5000 INJECTION INTRAVENOUS; SUBCUTANEOUS at 17:20

## 2018-12-10 RX ADMIN — SODIUM CHLORIDE 50 MILLILITER(S): 9 INJECTION, SOLUTION INTRAVENOUS at 08:21

## 2018-12-10 RX ADMIN — QUETIAPINE FUMARATE 100 MILLIGRAM(S): 200 TABLET, FILM COATED ORAL at 22:02

## 2018-12-10 RX ADMIN — DIVALPROEX SODIUM 500 MILLIGRAM(S): 500 TABLET, DELAYED RELEASE ORAL at 08:21

## 2018-12-10 RX ADMIN — SENNA PLUS 2 TABLET(S): 8.6 TABLET ORAL at 22:02

## 2018-12-10 RX ADMIN — RISPERIDONE 1 MILLIGRAM(S): 4 TABLET ORAL at 08:20

## 2018-12-10 RX ADMIN — TAMSULOSIN HYDROCHLORIDE 0.8 MILLIGRAM(S): 0.4 CAPSULE ORAL at 22:02

## 2018-12-10 RX ADMIN — Medication 1000 MILLIGRAM(S): at 22:03

## 2018-12-10 NOTE — GOALS OF CARE CONVERSATION - PERSONAL ADVANCE DIRECTIVE - CONVERSATION DETAILS
Spoke to pts wife regarding resuscitation and she states that she is not sure yet and doesn't want to make a decision at this time but is agreeable to follow up

## 2018-12-10 NOTE — PROGRESS NOTE ADULT - PROBLEM SELECTOR PLAN 6
Gastrointestinal stress ulcer prophylaxis l and DVT prophylaxis administrated continue home medications

## 2018-12-10 NOTE — PROGRESS NOTE ADULT - PROBLEM SELECTOR PLAN 4
continue current management and medications ,neurology consult for comanagement of dementia haldol  increased the dose prn ,psych cons is pending

## 2018-12-10 NOTE — PROGRESS NOTE ADULT - SUBJECTIVE AND OBJECTIVE BOX
WILLIAM CAGE [facility] S [phone] 1945   CONTACT Sp Elaine ANAYA [phone] self [phone]   ALLERGY nka   REASON FOR VISIT     Subsequent pulmonary followup  Initial evaluation/Pulmonary Critical Care consultation requested on 12/6/2018  by Dr Marcos   from Dr Brothers   Patient examined chart reviewed  HOSPITAL ADMISSION [facility] S 12/6/2018    PATIENT CAME  FROM (if information available)      IVF  D5 100 (12/6) ch D5 75 (12/6) ch D5 50 (12/9)    VITALS/LABS      12/10/2018 afeb 80 80 120/70 16 97%   12/9/2018 W 11.9 Hb 12.8 Plt 294 Na 146 K 4.2 CO2 31 Cr .9     REVIEW OF SYMPTOMS    Able to give ROS  NO     PHYSICAL EXAM    HEENT Unremarkable PERRLA atraumatic   RESP Fair air entry EXP prolonged    Harsh breath sound Resp distres mild   CARDIAC S1 S2 No S3     NO JVD    ABDOMEN SOFT BS PRESENT NOT DISTENDED No hepatosplenomegaly PEDAL EDEMA present No calf tenderness  NO rash   GENERAL Not TOXIC looking    GLOBAL ISSUE/BEST PRACTICE:      PROBLEM: HOB elevation:   y            PROBLEM: Stress ulcer proph:    pepcid 20 (12/6)                       PROBLEM: VTE prophylaxis:      hsc (12/6)   PROBLEM: Glycemic control:    na  PROBLEM: Nutrition:    soft DASH (12/6)   PROBLEM: Advanced directive: na     PROBLEM: Allergies:  na    PATIENT DESCRIPTION PATIENT WILLIAM CAGE  12/6/2018  ADMISSION [facility] S  DR GAMALIEL STEINBERG            74 y/o M pt w/ PMHx Alzheimer's disease, subdural hematoma presents to the ED BIBA from [facility] for evaluation of agitation. Pt sent to ED by PMD for eval of agitation. admitted 12/6/2018Pt unable to provide hx due to pt's hx of Alzheimer's.   Pt was noted to be hypernatremic Pulm consulted 12/6/2018 because pt has HO HARISH     PROBLEM SPECIFIC PATIENT DATA  PATIENT WILLIAM CAGE  12/6/2018  ADMISSION [facility] S  DR GAMALIEL STEINBERG       DEHYDRATION HYPERNATREMIA  D5 100 (12/6)  ch D5 75 (12/6)   12/6-12/7-12/8-12/9 Na [phone]  HARISH   CPAP 10 (12/5) ordered empirically   12/7/2018 DW pt spouse last night She agreed that pt likely will not keep cpap on   NEUROPSYCHOTROPIC   Quetiapine 100 (12/6)   depakote 500.2 (12/6)   depakote 1000 (12/6)   Haldol 1.4p (12/6)   BPH  Tamsulosin .4 (12/6)          ASSESSMENT/RECOMMENDATIONS PATIENT WILLIAM CAGE  12/6/2018  ADMISSION [facility] S  DR GAMALIEL STEINBERG       DEHYDRATION HYPERNATREMIA  Dehydration resolving Hypernatremia improving   HARISH   Patient not using CPAP but has remained clinically stable   NEUROPSYCHOTROPIC   On meds agitation improved   BPH  Tamsulosin .4 (12/6)          TIME SPENT Over 25 minutes aggregate care time spent on encounter; activities included   direct patient care, counseling and/or coordinating care reviewing notes, lab data/ imaging , discussion with multidisciplinary team/ patient  /family. Risks, benefits, alternatives  discussed in detail.

## 2018-12-10 NOTE — PROGRESS NOTE ADULT - PROBLEM SELECTOR PLAN 1
SERIAL BMP ,CONTINUE IVFS -D5 W , NEPHROLOGY EVAL ,wife refuses NGT AND GT ,patient " will pull out it any way during periods agitation " and combativenessPeriods of agitation reported ,refuses meals and pills .Prealbumin is 9 NUTRITIONIST CONSULT IS PENDING  Palliative care consult requested ,to discuss advance directives/artificial nutrition  and complete MOLST Wife is refusing NGT/GT AND requests more guidance from PALL CARE TEAM .She considers CMO likely mostly related to advanced dementia with severe agitation ( patient is known to me and had behavioral issues and aggressive ,combative behavior even with normal labs and bmp few days ago ) Hypernatremia and bun/cr elevation could contribute to encephalopathy and changed mental state as well .No evidence of VIRI found ,

## 2018-12-10 NOTE — PROGRESS NOTE ADULT - PROBLEM SELECTOR PLAN 3
continue home medications continue current management and medications ,neurology consult for comanagement of dementia

## 2018-12-10 NOTE — CHART NOTE - NSCHARTNOTEFT_GEN_A_CORE
Assessment: 73 year old male pmhx significant for alzheimers disease adm from [facility] c poor po intake and lethargy.  Pt also admitted c hypernatremia secondary to dehydration, which persists but is improving c D5@50ml/hr; would consider increasing rate as pt is consuming minimal po fluids.  Diet order currently for soft c dash/tlc c ensure enlive once daily.  Po intake overall continues to remain inadequate; per RN, pt has refused meals today.  Per documentation, pt needs much encouragement at meals and has previously eaten this admission, per social work who discussed c wife, wife was able to get pt to eat during lunch this saturday (pt however refused breakfast and dinner that day); it appears inadequate oral intake is related to progression of alzheimers disease.  Pt is a full assist at meals. Recommend increasing Ensure Enlive to 3x/day as each 8oz bottle provides 350kcal, 20g protein.  Recommend also downgrading diet to mechanical soft as pt is lethargic.  Will also provide magic cup fortified ice cream bid for addtl 290kcal, 9g protein per 4oz cup.  Per chart, artificial nutrition has been discussed c wife- she is currently refusing NGT or PEG as pt is likely to pull out during periods of agitation.  RD to follow closely.    Factors impacting intake: [ ] none [ ] nausea  [ ] vomiting [ ] diarrhea [ ] constipation  [ ]chewing problems [ ] swallowing issues  [ x] other: alzheimers disease    Diet Presciption: Diet, Soft:   DASH/TLC {Sodium & Cholesterol Restricted}  Supplement Feeding Modality:  Oral  Ensure Enlive Cans or Servings Per Day:  1       Frequency:  Daily (12-06-18 @ 15:18)    Intake: inadequate, refuses to eat at time- needs encouragement    Current Weight: Weight (kg): 113.4 (12-06 @ 12:55)a  adm wt: 250#, wt from 12/6 documented as 225#, unlikely pt had ~25# wt loss, recommend daily wts to better ascertain wt status/trend    Pertinent Medications: MEDICATIONS  (STANDING):  cyanocobalamin 500 MICROGram(s) Oral daily  dextrose 5%. 1000 milliLiter(s) (50 mL/Hr) IV Continuous <Continuous>  famotidine    Tablet 20 milliGRAM(s) Oral daily  heparin  Injectable 5000 Unit(s) SubCutaneous every 12 hours  polyethylene glycol 3350 17 Gram(s) Oral two times a day  QUEtiapine 100 milliGRAM(s) Oral at bedtime  risperiDONE  Disintegrating Tablet 1 milliGRAM(s) Oral daily  risperiDONE  Disintegrating Tablet 1 milliGRAM(s) Oral <User Schedule>  senna 2 Tablet(s) Oral at bedtime  tamsulosin 0.8 milliGRAM(s) Oral at bedtime  valproic  acid Syrup 500 milliGRAM(s) Oral <User Schedule>  valproic  acid Syrup 1000 milliGRAM(s) Oral at bedtime    MEDICATIONS  (PRN):  acetaminophen   Tablet .. 650 milliGRAM(s) Oral every 6 hours PRN Temp greater or equal to 38C (100.4F), Mild Pain (1 - 3)  haloperidol    Injectable 3 milliGRAM(s) IntraMuscular every 4 hours PRN severe agitation like pulling lines  traMADol 25 milliGRAM(s) Oral every 6 hours PRN Moderate Pain (4 - 6)    Pertinent Labs: 12-09 Na146 mmol/L<H> Glu 104 mg/dL<H> K+ 4.2 mmol/L Cr  0.99 mg/dL BUN 11 mg/dL 12-06 Alb 2.4 g/dL<L> 12-07 PAB 9 mg/dL<L>     CAPILLARY BLOOD GLUCOSE        Skin: forehead scab    Estimated Needs:   [ x] no change since previous assessment  [ ] recalculated:     Previous Nutrition Diagnosis:   [ ] Inadequate Energy Intake [x ]Inadequate Oral Intake [ ] Excessive Energy Intake   [ ] Underweight [ ] Increased Nutrient Needs [ ] Overweight/Obesity   [ ] Altered GI Function [ ] Unintended Weight Loss [ ] Food & Nutrition Related Knowledge Deficit [ ] Malnutrition     Nutrition Diagnosis is [ x] ongoing  [ ] resolved [ ] not applicable     New Nutrition Diagnosis: [ ] not applicable       Interventions: soft diet, dash/tlc- provide addtl nutritional supplement (ensure enlive tid); will also provide magic cup fortified ice cream bid for addtl kcal/protein, D5  Recommend  [ ] Change Diet To:  [x ] Nutrition Supplement- ensure enlive tid  [ ] Nutrition Support  [x ] Other: increase ivfs, daily wts    Monitoring and Evaluation:   [x ] PO intake [ x ] Tolerance to diet prescription [ x ] weights [ x ] labs[ x ] follow up per protocol  [ ] other:

## 2018-12-10 NOTE — PROGRESS NOTE ADULT - SUBJECTIVE AND OBJECTIVE BOX
PROGRESS NOTE  Patient is a 73y old  Male who presents with a chief complaint of poor po intake and lethargy (10 Dec 2018 13:07)  Chart and available morning labs /imaging are reviewed electronically , urgent issues addressed . More information  is being added upon completion of rounds , when more information is collected and management discussed with consultants , medical staff and social service/case management on the floor   OVERNIGHT  No new issues reported by medical staff . All above noted Patient is resting in a bed comfortably .Confused ,poor mentation .No distress noted   Periods of agitation reported ,refuses meals and pills .Prealbumin is 9 NUTRITIONIST CONSULT IS PENDING  Palliative care consult requested ,to discuss advance directives/artificial nutrition  and complete MOLST Wife is refusing NGT/GT AND requests more guidance from PALL CARE TEAM .She considers CMO    HPI:  72 yo AAM with hx of ALZHEIMER'S DISEASE ,SUBDURAL HEMATOMA, HTN, CAD ,GERD , HARISH ,VIT B12 DEFICIENCY ,GOUT ,CONSTIPATION .brought to ED from [facility] due to poor po intake and lethargy ,worsening for 3 days . Patient refused iv line placement and labs due to severe agitation and dementia ,in [facility] he was  combative and aggressive with medical staff and psychiatric evaluation is  requested Admit for iv hydration, monitor renal profile and urine output ,nutritionist consult ,prealbumin level ,serial bmp, nutritional supplements, multivitamins ,palliative care evaluation  regarding MOLST completion and artificial nutrition discussion . (06 Dec 2018 15:20)  PAST MEDICAL & SURGICAL HISTORY:  Alzheimer disease  Urinary retention  BPH (benign prostatic hyperplasia)  Constipation  HTN (hypertension)  SDH (subdural hematoma)  HARISH (obstructive sleep apnea)  GERD (gastroesophageal reflux disease)  Agitation  Dementia  ASHD (arteriosclerotic heart disease)  Gout  MEDICATIONS  (STANDING):  cyanocobalamin 500 MICROGram(s) Oral daily  dextrose 5%. 1000 milliLiter(s) (50 mL/Hr) IV Continuous <Continuous>  famotidine    Tablet 20 milliGRAM(s) Oral daily  heparin  Injectable 5000 Unit(s) SubCutaneous every 12 hours  polyethylene glycol 3350 17 Gram(s) Oral two times a day  QUEtiapine 100 milliGRAM(s) Oral at bedtime  risperiDONE  Disintegrating Tablet 1 milliGRAM(s) Oral daily  risperiDONE  Disintegrating Tablet 1 milliGRAM(s) Oral <User Schedule>  senna 2 Tablet(s) Oral at bedtime  tamsulosin 0.8 milliGRAM(s) Oral at bedtime  valproic  acid Syrup 500 milliGRAM(s) Oral <User Schedule>  valproic  acid Syrup 1000 milliGRAM(s) Oral at bedtime    MEDICATIONS  (PRN):  acetaminophen   Tablet .. 650 milliGRAM(s) Oral every 6 hours PRN Temp greater or equal to 38C (100.4F), Mild Pain (1 - 3)  haloperidol    Injectable 3 milliGRAM(s) IntraMuscular every 4 hours PRN severe agitation like pulling lines  traMADol 25 milliGRAM(s) Oral every 6 hours PRN Moderate Pain (4 - 6)      OBJECTIVE    T(C): 36.7 (12-10-18 @ 14:12), Max: 37.1 (12-09-18 @ 21:00)  HR: 61 (12-10-18 @ 14:12) (61 - 88)  BP: 187/83 (12-10-18 @ 14:12) (116/75 - 187/83)  RR: 20 (12-10-18 @ 14:12) (15 - 20)  SpO2: 97% (12-10-18 @ 05:29) (95% - 97%)  Wt(kg): --  I&O's Summary    10 Dec 2018 07:01  -  10 Dec 2018 14:35  --------------------------------------------------------  IN: 0 mL / OUT: 0 mL / NET: 0 mL    REVIEW OF SYSTEMS:  CONSTITUTIONAL: No fever, weight loss, or fatigue  EYES: No eye pain, visual disturbances, or discharge  ENMT:   No sinus or throat pain  NECK: No pain or stiffness  RESPIRATORY: No cough, wheezing, chills or hemoptysis; No shortness of breath  CARDIOVASCULAR: No chest pain, palpitations, dizziness, or leg swelling  GASTROINTESTINAL: No abdominal pain. No nausea, vomiting; No diarrhea or constipation. No melena or hematochezia.  GENITOURINARY: No dysuria, frequency, hematuria, or incontinence  NEUROLOGICAL: No headaches, memory loss, loss of strength, numbness, or tremors  SKIN: No itching, burning, rashes, or lesions   MUSCULOSKELETAL: No joint pain or swelling; No muscle, back, or extremity pain  PHYSICAL EXAM:  Appearance: NAD. VS past 24 hrs -as above   HEENT:   Moist oral mucosa. Conjunctiva clear b/l.   Neck : supple>  Respiratory: Lungs CTAB.  Gastrointestinal:  Soft, nontender. No rebound. No rigidity. BS present	  Cardiovascular: RRR ,S1S2 present  Neurologic: Non-focal. Moving all extremities.  Extremities: No edema. No erythema. No calf tenderness.  Skin: No rashes, No ecchymoses, No cyanosis.	  wounds ,skin lesions-See skin assesment flow sheet        LABS:                        12.8   11.99 )-----------( 294      ( 09 Dec 2018 08:12 )             39.4     12-09    146<H>  |  108  |  11  ----------------------------<  104<H>  4.2   |  31  |  0.99    Ca    8.6      09 Dec 2018 08:12      CAPILLARY BLOOD GLUCOSE              RADIOLOGY & ADDITIONAL TESTS:   reviewed elctronically  ASSESSMENT/PLAN:

## 2018-12-10 NOTE — PROGRESS NOTE ADULT - SUBJECTIVE AND OBJECTIVE BOX
Patient is a 73y Male whom presented to the hospital with ckd and haleigh . hypernatremia     PAST MEDICAL & SURGICAL HISTORY:  Alzheimer disease  Urinary retention  BPH (benign prostatic hyperplasia)  Constipation  HTN (hypertension)  SDH (subdural hematoma)  HARISH (obstructive sleep apnea)  GERD (gastroesophageal reflux disease)  Agitation  Dementia  ASHD (arteriosclerotic heart disease)  Gout      MEDICATIONS  (STANDING):  cyanocobalamin 500 MICROGram(s) Oral daily  dextrose 5%. 1000 milliLiter(s) (100 mL/Hr) IV Continuous <Continuous>  diVALproex  milliGRAM(s) Oral <User Schedule>  diVALproex DR 1000 milliGRAM(s) Oral at bedtime  famotidine    Tablet 20 milliGRAM(s) Oral daily  heparin  Injectable 5000 Unit(s) SubCutaneous every 12 hours  polyethylene glycol 3350 17 Gram(s) Oral two times a day  QUEtiapine 100 milliGRAM(s) Oral at bedtime  senna 2 Tablet(s) Oral at bedtime  tamsulosin 0.8 milliGRAM(s) Oral at bedtime        REVIEW OF SYSTEMS:    unable to obtaind a good ros                                                                                                               12.8   11.99 )-----------( 294      ( 09 Dec 2018 08:12 )             39.4       CBC Full  -  ( 09 Dec 2018 08:12 )  WBC Count : 11.99 K/uL  Hemoglobin : 12.8 g/dL  Hematocrit : 39.4 %  Platelet Count - Automated : 294 K/uL  Mean Cell Volume : 92.7 fl  Mean Cell Hemoglobin : 30.1 pg  Mean Cell Hemoglobin Concentration : 32.5 gm/dL  Auto Neutrophil # : x  Auto Lymphocyte # : x  Auto Monocyte # : x  Auto Eosinophil # : x  Auto Basophil # : x  Auto Neutrophil % : x  Auto Lymphocyte % : x  Auto Monocyte % : x  Auto Eosinophil % : x  Auto Basophil % : x      12-09    146<H>  |  108  |  11  ----------------------------<  104<H>  4.2   |  31  |  0.99    Ca    8.6      09 Dec 2018 08:12        CAPILLARY BLOOD GLUCOSE          Vital Signs Last 24 Hrs  T(C): 37.1 (10 Dec 2018 10:47), Max: 37.1 (09 Dec 2018 21:00)  T(F): 98.7 (10 Dec 2018 10:47), Max: 98.8 (09 Dec 2018 21:00)  HR: 86 (10 Dec 2018 10:47) (80 - 88)  BP: 163/84 (10 Dec 2018 10:47) (116/75 - 163/84)  BP(mean): --  RR: 18 (10 Dec 2018 10:47) (15 - 18)  SpO2: 97% (10 Dec 2018 05:29) (95% - 97%)                            PHYSICAL EXAM:    Constitutional: NAD  HEENT: conjunctive   clear   Neck:  No JVD  Respiratory: decrease bs b/l   Cardiovascular: S1 and S2  Gastrointestinal: BS+, soft, NT/ND  Extremities: No peripheral edema

## 2018-12-10 NOTE — PROGRESS NOTE ADULT - PROBLEM SELECTOR PLAN 5
haldol  increased the dose prn ,psych cons is pending Admit for iv hydration,monitor renal profile and urine output,nutritionist consult,prealbumin level,serial bmp,nutritional supplements,multivitamins,palliative care evaluuation regarding MOLST completion and artificial nutrition discussion

## 2018-12-10 NOTE — PROGRESS NOTE ADULT - PROBLEM SELECTOR PLAN 1
hypernatremia continue with water   ACUTE RENAL FAILURE: due  decrease fluid intake continue with fluid   Serum creatinine is improving     , approximating GFR is improved    ml/min.   There is  progression . No uremic symptoms    No evidence of anemia .  Fluid status stable.  Will continue to avoid nephrotoxic drugs.  Patient remains asymptomatic.   Continue current therapy.

## 2018-12-10 NOTE — PROGRESS NOTE ADULT - SUBJECTIVE AND OBJECTIVE BOX
Neurology follow up note    NILES [id number]      Interval History:    Patient resting in bed     MEDICATIONS    acetaminophen   Tablet .. 650 milliGRAM(s) Oral every 6 hours PRN  cyanocobalamin 500 MICROGram(s) Oral daily  dextrose 5%. 1000 milliLiter(s) IV Continuous <Continuous>  diVALproex  milliGRAM(s) Oral <User Schedule>  diVALproex DR 1000 milliGRAM(s) Oral at bedtime  famotidine    Tablet 20 milliGRAM(s) Oral daily  haloperidol    Injectable 3 milliGRAM(s) IntraMuscular every 4 hours PRN  heparin  Injectable 5000 Unit(s) SubCutaneous every 12 hours  polyethylene glycol 3350 17 Gram(s) Oral two times a day  QUEtiapine 100 milliGRAM(s) Oral at bedtime  risperiDONE  Disintegrating Tablet 1 milliGRAM(s) Oral daily  risperiDONE  Disintegrating Tablet 1 milliGRAM(s) Oral <User Schedule>  senna 2 Tablet(s) Oral at bedtime  tamsulosin 0.8 milliGRAM(s) Oral at bedtime  traMADol 25 milliGRAM(s) Oral every 6 hours PRN      Allergies    No Known Allergies    Intolerances            Vital Signs Last 24 Hrs  T(C): 36.5 (10 Dec 2018 05:29), Max: 37.1 (09 Dec 2018 21:00)  T(F): 97.7 (10 Dec 2018 05:29), Max: 98.8 (09 Dec 2018 21:00)  HR: 80 (10 Dec 2018 05:29) (80 - 88)  BP: 121/73 (10 Dec 2018 05:29) (116/75 - 137/78)  BP(mean): --  RR: 16 (10 Dec 2018 05:29) (15 - 18)  SpO2: 97% (10 Dec 2018 05:29) (95% - 97%)      REVIEW OF SYSTEMS: Limited or unable to obtain secondary to patient's poor mental status.        On Neurological Examination:  The patient is arousable to verbal stimuli, opens eyes.    Extraocular movements were intact.  The patient will say irrelevant things.    Speech was fluent.  No dysarthria noted.    Motor, bilateral upper appeared to be 4/5.    Bilateral lower, plantar stimulation and flexation of the hip and knee was 3/5.    Did not notice any gross focality.    Exam is limited secondary to the patient not following commands.    Does not follow commands    GENERAL Exam: Nontoxic , No Acute Distress   	  HEENT:  normocephalic, atraumatic  		  LUNGS: Clear bilaterally    	  HEART: Normal S1S2   No murmur RRR        	  GI/ ABDOMEN:  Soft  Non tender    EXTREMITIES:   No Edema  No Clubbing  No Cyanosis     MUSCULOSKELETAL: decreased Range of Motion all 4 extremities   	   SKIN: Normal  No Ecchymosis             LABS:  CBC Full  -  ( 09 Dec 2018 08:12 )  WBC Count : 11.99 K/uL  Hemoglobin : 12.8 g/dL  Hematocrit : 39.4 %  Platelet Count - Automated : 294 K/uL  Mean Cell Volume : 92.7 fl  Mean Cell Hemoglobin : 30.1 pg  Mean Cell Hemoglobin Concentration : 32.5 gm/dL  Auto Neutrophil # : x  Auto Lymphocyte # : x  Auto Monocyte # : x  Auto Eosinophil # : x  Auto Basophil # : x  Auto Neutrophil % : x  Auto Lymphocyte % : x  Auto Monocyte % : x  Auto Eosinophil % : x  Auto Basophil % : x      12-09    146<H>  |  108  |  11  ----------------------------<  104<H>  4.2   |  31  |  0.99    Ca    8.6      09 Dec 2018 08:12      Hemoglobin A1C:       Vitamin B12         RADIOLOGY      ANALYSIS AND PLAN:  A 73-year-old with episode of change in the mental status, history of subdural hematoma.  1.	For change in the mental status, suspect most likely secondary to progressive dementia along with underlying metabolic etiology, hypernatremia, and decreased oral intake.  Examination was limited but I do not see any clear signs to suggest new cerebrovascular accident had ensued.  2.	I would recommend IV fluid hydration.  3.	Monitor oral intake.  4.	Monitor sodium levels.  5.	For history of dementia, the patient did try medications in the past but appeared to have side effects secondary to the patient's age and it appeared to be advanced, did not feel that medications would be of any benefit.  6.	For history of subdural hematoma, as per my conversation with spouse, his last CT imaging showed had resolution of it.  7.	Spoke with spouse Elaine at bedside, her telephone number is [phone]. 12/10/18  She understands while in the hospital setting may become more disoriented.  8.	For agitation, continue the patient on his Depakote and Seroquel.  Haldol was added.  Make adjustments to medications as needed. Patient on Depakote as a mood stabilizer not for seizures ok to bojorquez to alt mood stabilizer psych noted plan to taper seroquel-- wll change depakote to liquid form   9.	psych follow up     Thank you for the courtesy of consultation.    Physical therapy evaluation as tolerated  OOB to chair/ambulation with assistance only if possible.    Greater than 40 minutes spent in direct patient care reviewing  the notes, lab data/ imaging , discussion with multidisciplinary team.

## 2018-12-10 NOTE — PROGRESS NOTE ADULT - NSHPATTENDINGPLANDISCUSS_GEN_ALL_CORE
med staff , Dr King ,spoke to the wife Elaine on a phone [phone] in [facility]

## 2018-12-10 NOTE — PROGRESS NOTE ADULT - PROBLEM SELECTOR PLAN 2
Admit for iv hydration,monitor renal profile and urine output,nutritionist consult,prealbumin level,serial bmp,nutritional supplements,multivitamins,palliative care evaluuation regarding MOLST completion and artificial nutrition discussion SERIAL BMP ,CONTINUE IVFS -D5 W , NEPHROLOGY EVAL ,wife refuses NGT AND GT ,patient " will pull out it any way during periods agitation " and combativenessPeriods of agitation reported ,refuses meals and pills .Prealbumin is 9 NUTRITIONIST CONSULT IS PENDING  Palliative care consult requested ,to discuss advance directives/artificial nutrition  and complete MOLST Wife is refusing NGT/GT AND requests more guidance from PALL CARE TEAM .She considers CMO

## 2018-12-11 DIAGNOSIS — G93.41 METABOLIC ENCEPHALOPATHY: ICD-10-CM

## 2018-12-11 DIAGNOSIS — R41.82 ALTERED MENTAL STATUS, UNSPECIFIED: ICD-10-CM

## 2018-12-11 DIAGNOSIS — E44.0 MODERATE PROTEIN-CALORIE MALNUTRITION: ICD-10-CM

## 2018-12-11 LAB
ANION GAP SERPL CALC-SCNC: 7 MMOL/L — SIGNIFICANT CHANGE UP (ref 5–17)
APPEARANCE UR: ABNORMAL
BILIRUB UR-MCNC: NEGATIVE — SIGNIFICANT CHANGE UP
BUN SERPL-MCNC: 9 MG/DL — SIGNIFICANT CHANGE UP (ref 7–23)
CALCIUM SERPL-MCNC: 8.6 MG/DL — SIGNIFICANT CHANGE UP (ref 8.4–10.5)
CHLORIDE SERPL-SCNC: 104 MMOL/L — SIGNIFICANT CHANGE UP (ref 96–108)
CO2 SERPL-SCNC: 32 MMOL/L — HIGH (ref 22–31)
COLOR SPEC: YELLOW — SIGNIFICANT CHANGE UP
CREAT SERPL-MCNC: 0.99 MG/DL — SIGNIFICANT CHANGE UP (ref 0.5–1.3)
DIFF PNL FLD: ABNORMAL
GLUCOSE SERPL-MCNC: 111 MG/DL — HIGH (ref 70–99)
GLUCOSE UR QL: NEGATIVE MG/DL — SIGNIFICANT CHANGE UP
HCT VFR BLD CALC: 40.1 % — SIGNIFICANT CHANGE UP (ref 39–50)
HGB BLD-MCNC: 13 G/DL — SIGNIFICANT CHANGE UP (ref 13–17)
KETONES UR-MCNC: NEGATIVE — SIGNIFICANT CHANGE UP
LEUKOCYTE ESTERASE UR-ACNC: ABNORMAL
MCHC RBC-ENTMCNC: 29.4 PG — SIGNIFICANT CHANGE UP (ref 27–34)
MCHC RBC-ENTMCNC: 32.4 GM/DL — SIGNIFICANT CHANGE UP (ref 32–36)
MCV RBC AUTO: 90.7 FL — SIGNIFICANT CHANGE UP (ref 80–100)
NITRITE UR-MCNC: POSITIVE
NRBC # BLD: 0 /100 WBCS — SIGNIFICANT CHANGE UP (ref 0–0)
PH UR: 7 — SIGNIFICANT CHANGE UP (ref 5–8)
PLATELET # BLD AUTO: 216 K/UL — SIGNIFICANT CHANGE UP (ref 150–400)
POTASSIUM SERPL-MCNC: 3.7 MMOL/L — SIGNIFICANT CHANGE UP (ref 3.5–5.3)
POTASSIUM SERPL-SCNC: 3.7 MMOL/L — SIGNIFICANT CHANGE UP (ref 3.5–5.3)
PROT UR-MCNC: 30 MG/DL
RBC # BLD: 4.42 M/UL — SIGNIFICANT CHANGE UP (ref 4.2–5.8)
RBC # FLD: 13.4 % — SIGNIFICANT CHANGE UP (ref 10.3–14.5)
SODIUM SERPL-SCNC: 143 MMOL/L — SIGNIFICANT CHANGE UP (ref 135–145)
SP GR SPEC: 1.01 — SIGNIFICANT CHANGE UP (ref 1.01–1.02)
UROBILINOGEN FLD QL: 8 MG/DL
WBC # BLD: 16.97 K/UL — HIGH (ref 3.8–10.5)
WBC # FLD AUTO: 16.97 K/UL — HIGH (ref 3.8–10.5)

## 2018-12-11 PROCEDURE — 71250 CT THORAX DX C-: CPT | Mod: 26

## 2018-12-11 PROCEDURE — 71045 X-RAY EXAM CHEST 1 VIEW: CPT | Mod: 26

## 2018-12-11 RX ADMIN — HEPARIN SODIUM 5000 UNIT(S): 5000 INJECTION INTRAVENOUS; SUBCUTANEOUS at 05:56

## 2018-12-11 RX ADMIN — TAMSULOSIN HYDROCHLORIDE 0.8 MILLIGRAM(S): 0.4 CAPSULE ORAL at 21:57

## 2018-12-11 RX ADMIN — Medication 500 MILLIGRAM(S): at 10:11

## 2018-12-11 RX ADMIN — FAMOTIDINE 20 MILLIGRAM(S): 10 INJECTION INTRAVENOUS at 12:16

## 2018-12-11 RX ADMIN — PREGABALIN 500 MICROGRAM(S): 225 CAPSULE ORAL at 12:16

## 2018-12-11 RX ADMIN — RISPERIDONE 1 MILLIGRAM(S): 4 TABLET ORAL at 10:11

## 2018-12-11 RX ADMIN — QUETIAPINE FUMARATE 100 MILLIGRAM(S): 200 TABLET, FILM COATED ORAL at 21:57

## 2018-12-11 RX ADMIN — HEPARIN SODIUM 5000 UNIT(S): 5000 INJECTION INTRAVENOUS; SUBCUTANEOUS at 17:52

## 2018-12-11 RX ADMIN — TRAMADOL HYDROCHLORIDE 25 MILLIGRAM(S): 50 TABLET ORAL at 12:15

## 2018-12-11 RX ADMIN — Medication 500 MILLIGRAM(S): at 15:49

## 2018-12-11 RX ADMIN — Medication 1000 MILLIGRAM(S): at 21:57

## 2018-12-11 RX ADMIN — RISPERIDONE 1 MILLIGRAM(S): 4 TABLET ORAL at 13:36

## 2018-12-11 RX ADMIN — TRAMADOL HYDROCHLORIDE 25 MILLIGRAM(S): 50 TABLET ORAL at 13:41

## 2018-12-11 RX ADMIN — SENNA PLUS 2 TABLET(S): 8.6 TABLET ORAL at 21:57

## 2018-12-11 NOTE — PROGRESS NOTE ADULT - SUBJECTIVE AND OBJECTIVE BOX
PROGRESS NOTE  Patient is a 73y old  Male who presents with a chief complaint of poor po intake and lethargy (11 Dec 2018 11:13)  Chart and available morning labs /imaging are reviewed electronically , urgent issues addressed . More information  is being added upon completion of rounds , when more information is collected and management discussed with consultants , medical staff and social service/case management on the floor   OVERNIGHT  No new issues reported by medical staff . All above noted Patient is resting in a bed comfortably .Confused ,poor mentation .No distress noted   Poor cooperation with med staff ,refuses most of meals and oral medications .Wife considers CMO ,but didnt make a final decision yet .Palliative care input requested . As per aid at bedside patient didnt have breakfast and lunch today .Nutritionist input is appreciated ,supplements are ordered ,but patient is not able to take .WBC elevation is noted and CTT is ordered by pulm Dr Brothers   HPI:  74 yo AAM with hx of ALZHEIMER'S DISEASE ,SUBDURAL HEMATOMA, HTN, CAD ,GERD , HARISH ,VIT B12 DEFICIENCY ,GOUT ,CONSTIPATION .brought to ED from [facility] due to poor po intake and lethargy ,worsening for 3 days . Patient refused iv line placement and labs due to severe agitation and dementia ,[facility] he was  combative and aggressive with medical staff and psychiatric evaluation is  requested Admit for iv hydration, monitor renal profile and urine output ,nutritionist consult ,prealbumin level ,serial bmp, nutritional supplements, multivitamins ,palliative care evaluation  regarding MOLST completion and artificial nutrition discussion . (06 Dec 2018 15:20)  PAST MEDICAL & SURGICAL HISTORY:  Alzheimer disease  Urinary retention  BPH (benign prostatic hyperplasia)  Constipation  HTN (hypertension)  SDH (subdural hematoma)  HARISH (obstructive sleep apnea)  GERD (gastroesophageal reflux disease)  Agitation  Dementia  ASHD (arteriosclerotic heart disease)  Gout  MEDICATIONS  (STANDING):  cyanocobalamin 500 MICROGram(s) Oral daily  dextrose 5%. 1000 milliLiter(s) (50 mL/Hr) IV Continuous <Continuous>  famotidine    Tablet 20 milliGRAM(s) Oral daily  heparin  Injectable 5000 Unit(s) SubCutaneous every 12 hours  polyethylene glycol 3350 17 Gram(s) Oral two times a day  QUEtiapine 100 milliGRAM(s) Oral at bedtime  risperiDONE  Disintegrating Tablet 1 milliGRAM(s) Oral daily  risperiDONE  Disintegrating Tablet 1 milliGRAM(s) Oral <User Schedule>  senna 2 Tablet(s) Oral at bedtime  tamsulosin 0.8 milliGRAM(s) Oral at bedtime  valproic  acid Syrup 500 milliGRAM(s) Oral <User Schedule>  valproic  acid Syrup 1000 milliGRAM(s) Oral at bedtime  MEDICATIONS  (PRN):  acetaminophen   Tablet .. 650 milliGRAM(s) Oral every 6 hours PRN Temp greater or equal to 38C (100.4F), Mild Pain (1 - 3)  haloperidol    Injectable 3 milliGRAM(s) IntraMuscular every 4 hours PRN severe agitation like pulling lines  traMADol 25 milliGRAM(s) Oral every 6 hours PRN Moderate Pain (4 - 6)  OBJECTIVE  T(C): 36.9 (12-11-18 @ 10:05), Max: 36.9 (12-11-18 @ 10:05)  HR: 85 (12-11-18 @ 10:05) (64 - 96)  BP: 120/71 (12-11-18 @ 10:05) (113/70 - 135/79)  RR: 18 (12-11-18 @ 10:05) (18 - 20)  SpO2: 95% (12-11-18 @ 10:05) (95% - 97%)  Wt(kg): --  I&O's Summary  10 Dec 2018 07:01  -  11 Dec 2018 07:00  --------------------------------------------------------  IN: 1070 mL / OUT: 0 mL / NET: 1070 mL  11 Dec 2018 07:01  -  11 Dec 2018 14:50  --------------------------------------------------------  IN: 140 mL / OUT: 0 mL / NET: 140 mL  REVIEW OF SYSTEMS:  ROS is unobtainable due to lethargy and confusion   PHYSICAL EXAM:  Appearance: NAD. VS past 24 hrs -as above   HEENT:   Moist oral mucosa. Conjunctiva clear b/l.   Neck : supple  Respiratory: Lungs CTAB.  Gastrointestinal:  Soft, nontender. No rebound. No rigidity. BS present	  Cardiovascular: RRR ,S1S2 present  Neurologic: Non-focal. Moving all extremities.  Extremities: No edema. No erythema. No calf tenderness.  Skin: No rashes, No ecchymoses, No cyanosis.	  wounds ,skin lesions-See skin assessment flow sheet   LABS:                     13.0   16.97 )-----------( 216      ( 11 Dec 2018 08:31 )             40.1   12-11  143  |  104  |  9   ----------------------------<  111<H>  3.7   |  32<H>  |  0.99  Ca    8.6      11 Dec 2018 08:31  CAPILLARY BLOOD GLUCOSE  RADIOLOGY & ADDITIONAL TESTS:< from: CT Chest No Cont (12.11.18 @ 14:11) >  EXAM:  CT CHEST                        PROCEDURE DATE:  12/11/2018    INTERPRETATION:  History: Pneumonia.  Noncontrast chest CT.  There are streaky fibrotic/atelectatic changes at the dependent lung   bases. There is additionalsmall patchy opacity in the left lower lobe   posteriorly which may represent subsegmental atelectasis and/or   pneumonia. Correlate clinically for active infection. No pleural   collections.  Central airways patent. No mediastinal adenopathy. Hilar evaluation   limited without IV contrast. Nonaneurysmal thoracic aorta. Normal heart   size with coronary artery calcification. Trace pericardial effusion   versus pericardial thickening.  Punctate nonobstructive left renal calculus.  Degenerative change dorsal spine.  Impression:  Left basilar subsegmental atelectasis and/or pneumonia.  Additional findings as discussed.  < end of copied text >   reviewed elctronically  ASSESSMENT/PLAN:

## 2018-12-11 NOTE — PROGRESS NOTE ADULT - SUBJECTIVE AND OBJECTIVE BOX
WILLIAM CAGE [facility] S [phone]   CONTACT Sp Elaine ANAYA [phone] self [phone]   ALLERGY nka   REASON FOR VISIT     Subsequent pulmonary followup  Initial evaluation/Pulmonary Critical Care consultation requested on 12/6/2018  by Dr Marcos   from Dr Brothers   Patient examined chart reviewed  HOSPITAL ADMISSION [facility] 12/6/2018    PATIENT CAME  FROM (if information available)      IVF  D5 100 (12/6) ch D5 75 (12/6) ch D5 50 (12/9)    VITALS/LABS      12/11/2018 afeb 85 120/70 18 95%   12/11/2018 W 16.9 Hb 13 Plt 216 Na 143 K 3.7 CO2 32 Cr .9     REVIEW OF SYMPTOMS    Able to give ROS  NO     PHYSICAL EXAM    HEENT Unremarkable PERRLA atraumatic   RESP Fair air entry EXP prolonged    Harsh breath sound Resp distres mild   CARDIAC S1 S2 No S3     NO JVD    ABDOMEN SOFT BS PRESENT NOT DISTENDED No hepatosplenomegaly PEDAL EDEMA present No calf tenderness  NO rash   GENERAL Not TOXIC looking    GLOBAL ISSUE/BEST PRACTICE:      PROBLEM: HOB elevation:   y            PROBLEM: Stress ulcer proph:    pepcid 20 (12/6)                       PROBLEM: VTE prophylaxis:      hsc (12/6)   PROBLEM: Glycemic control:    na  PROBLEM: Nutrition:    soft DASH (12/6)   PROBLEM: Advanced directive: na     PROBLEM: Allergies:  na    PATIENT DESCRIPTION PATIENT WILLIAM CAGE  12/6/2018  ADMISSION [facility] S  DR GAMALIEL STEINBERG            72 y/o M pt w/ PMHx Alzheimer's disease, subdural hematoma presents to the ED BIBA from [facility] for evaluation of agitation. Pt sent to ED by PMD for eval of agitation. admitted 12/6/2018Pt unable to provide hx due to pt's hx of Alzheimer's.   Pt was noted to be hypernatremic Pulm consulted 12/6/2018 because pt has HO HARISH     PROBLEM SPECIFIC PATIENT DATA  PATIENT WILLIAM CAGE  12/6/2018  ADMISSION [facility] S  DR GAMALIEL STEINBERG       DEHYDRATION HYPERNATREMIA  D5 100 (12/6)  ch D5 75 (12/6)   12/6-12/7-12/8-12/9-12/11 Na [phone]  HARISH   CPAP 10 (12/5) ordered empirically   12/7/2018 DW pt spouse last night She agreed that pt likely will not keep cpap on   NEUROPSYCHOTROPIC   Quetiapine 100 (12/6)   depakote 500.2 (12/6)   depakote 1000 (12/6)   Haldol 1.4p (12/6)   BPH  Tamsulosin .4 (12/6)          ASSESSMENT/RECOMMENDATIONS PATIENT WILLIAM CAGE  12/6/2018  ADMISSION [facility] S  DR GAMALIEL STEINBERG       DEHYDRATION HYPERNATREMIA  Dehydration resolving Hypernatremia improving   HARISH   Patient not using CPAP but has remained clinically stable   LEUKOCYTOSIS 12/11/2018 Check CXR   NEUROPSYCHOTROPIC   On meds agitation improved   BPH  Tamsulosin .4 (12/6)          TIME SPENT Over 25 minutes aggregate care time spent on encounter; activities included   direct patient care, counseling and/or coordinating care reviewing notes, lab data/ imaging , discussion with multidisciplinary team/ patient  /family. Risks, benefits, alternatives  discussed in detail.

## 2018-12-11 NOTE — PROGRESS NOTE ADULT - SUBJECTIVE AND OBJECTIVE BOX
Patient is a 73y Male whom presented to the hospital with ckd and haleigh . hypernatremia   pt seen in am nad   PAST MEDICAL & SURGICAL HISTORY:  Alzheimer disease  Urinary retention  BPH (benign prostatic hyperplasia)  Constipation  HTN (hypertension)  SDH (subdural hematoma)  HARISH (obstructive sleep apnea)  GERD (gastroesophageal reflux disease)  Agitation  Dementia  ASHD (arteriosclerotic heart disease)  Gout      MEDICATIONS  (STANDING):  cyanocobalamin 500 MICROGram(s) Oral daily  dextrose 5%. 1000 milliLiter(s) (100 mL/Hr) IV Continuous <Continuous>  diVALproex  milliGRAM(s) Oral <User Schedule>  diVALproex DR 1000 milliGRAM(s) Oral at bedtime  famotidine    Tablet 20 milliGRAM(s) Oral daily  heparin  Injectable 5000 Unit(s) SubCutaneous every 12 hours  polyethylene glycol 3350 17 Gram(s) Oral two times a day  QUEtiapine 100 milliGRAM(s) Oral at bedtime  senna 2 Tablet(s) Oral at bedtime  tamsulosin 0.8 milliGRAM(s) Oral at bedtime        REVIEW OF SYSTEMS:    unable to obtaind a good ros                                                                                                                                  13.0   16.97 )-----------( 216      ( 11 Dec 2018 08:31 )             40.1       CBC Full  -  ( 11 Dec 2018 08:31 )  WBC Count : 16.97 K/uL  Hemoglobin : 13.0 g/dL  Hematocrit : 40.1 %  Platelet Count - Automated : 216 K/uL  Mean Cell Volume : 90.7 fl  Mean Cell Hemoglobin : 29.4 pg  Mean Cell Hemoglobin Concentration : 32.4 gm/dL  Auto Neutrophil # : x  Auto Lymphocyte # : x  Auto Monocyte # : x  Auto Eosinophil # : x  Auto Basophil # : x  Auto Neutrophil % : x  Auto Lymphocyte % : x  Auto Monocyte % : x  Auto Eosinophil % : x  Auto Basophil % : x      12-11    143  |  104  |  9   ----------------------------<  111<H>  3.7   |  32<H>  |  0.99    Ca    8.6      11 Dec 2018 08:31        CAPILLARY BLOOD GLUCOSE          Vital Signs Last 24 Hrs  T(C): 36.9 (11 Dec 2018 10:05), Max: 36.9 (11 Dec 2018 10:05)  T(F): 98.4 (11 Dec 2018 10:05), Max: 98.4 (11 Dec 2018 10:05)  HR: 85 (11 Dec 2018 10:05) (64 - 96)  BP: 120/71 (11 Dec 2018 10:05) (113/70 - 135/79)  BP(mean): --  RR: 18 (11 Dec 2018 10:05) (18 - 20)  SpO2: 95% (11 Dec 2018 10:05) (95% - 97%)    Urinalysis Basic - ( 11 Dec 2018 16:55 )    Color: Yellow / Appearance: Slightly Turbid / S.010 / pH: x  Gluc: x / Ketone: Negative  / Bili: Negative / Urobili: 8 mg/dL   Blood: x / Protein: 30 mg/dL / Nitrite: Positive   Leuk Esterase: Moderate / RBC: 25-50 /HPF / WBC >50   Sq Epi: x / Non Sq Epi: Few / Bacteria: Many                      PHYSICAL EXAM:    Constitutional: NAD  HEENT: conjunctive   clear   Neck:  No JVD  Respiratory: decrease bs b/l   Cardiovascular: S1 and S2  Gastrointestinal: BS+, soft, NT/ND  Extremities: No peripheral edema

## 2018-12-11 NOTE — PROGRESS NOTE ADULT - SUBJECTIVE AND OBJECTIVE BOX
Neurology follow up note    NILES [id number]      Interval History:    Patient resting in bed     MEDICATIONS    acetaminophen   Tablet .. 650 milliGRAM(s) Oral every 6 hours PRN  cyanocobalamin 500 MICROGram(s) Oral daily  dextrose 5%. 1000 milliLiter(s) IV Continuous <Continuous>  famotidine    Tablet 20 milliGRAM(s) Oral daily  haloperidol    Injectable 3 milliGRAM(s) IntraMuscular every 4 hours PRN  heparin  Injectable 5000 Unit(s) SubCutaneous every 12 hours  polyethylene glycol 3350 17 Gram(s) Oral two times a day  QUEtiapine 100 milliGRAM(s) Oral at bedtime  risperiDONE  Disintegrating Tablet 1 milliGRAM(s) Oral daily  risperiDONE  Disintegrating Tablet 1 milliGRAM(s) Oral <User Schedule>  senna 2 Tablet(s) Oral at bedtime  tamsulosin 0.8 milliGRAM(s) Oral at bedtime  traMADol 25 milliGRAM(s) Oral every 6 hours PRN  valproic  acid Syrup 500 milliGRAM(s) Oral <User Schedule>  valproic  acid Syrup 1000 milliGRAM(s) Oral at bedtime      Allergies    No Known Allergies    Intolerances            Vital Signs Last 24 Hrs  T(C): 36.9 (11 Dec 2018 10:05), Max: 36.9 (11 Dec 2018 10:05)  T(F): 98.4 (11 Dec 2018 10:05), Max: 98.4 (11 Dec 2018 10:05)  HR: 85 (11 Dec 2018 10:05) (64 - 96)  BP: 120/71 (11 Dec 2018 10:05) (113/70 - 135/79)  BP(mean): --  RR: 18 (11 Dec 2018 10:05) (18 - 20)  SpO2: 95% (11 Dec 2018 10:05) (95% - 97%)        REVIEW OF SYSTEMS: Limited or unable to obtain secondary to patient's poor mental status.        On Neurological Examination:  The patient is arousable to verbal stimuli, opens eyes.    Extraocular movements were intact.  The patient will say irrelevant things.    Speech was fluent.  No dysarthria noted.    Motor, bilateral upper appeared to be 4/5.    Bilateral lower, plantar stimulation and flexation of the hip and knee was 3/5.    Did not notice any gross focality.    Exam is limited secondary to the patient not following commands.    Does not follow commands    GENERAL Exam: Nontoxic , No Acute Distress   	  HEENT:  normocephalic, atraumatic  		  LUNGS: Clear bilaterally    	  HEART: Normal S1S2   No murmur RRR        	  GI/ ABDOMEN:  Soft  Non tender    EXTREMITIES:   No Edema  No Clubbing  No Cyanosis     MUSCULOSKELETAL: decreased Range of Motion all 4 extremities   	   SKIN: Normal  No Ecchymosis                LABS:  CBC Full  -  ( 11 Dec 2018 08:31 )  WBC Count : 16.97 K/uL  Hemoglobin : 13.0 g/dL  Hematocrit : 40.1 %  Platelet Count - Automated : 216 K/uL  Mean Cell Volume : 90.7 fl  Mean Cell Hemoglobin : 29.4 pg  Mean Cell Hemoglobin Concentration : 32.4 gm/dL  Auto Neutrophil # : x  Auto Lymphocyte # : x  Auto Monocyte # : x  Auto Eosinophil # : x  Auto Basophil # : x  Auto Neutrophil % : x  Auto Lymphocyte % : x  Auto Monocyte % : x  Auto Eosinophil % : x  Auto Basophil % : x      12-11    143  |  104  |  9   ----------------------------<  111<H>  3.7   |  32<H>  |  0.99    Ca    8.6      11 Dec 2018 08:31      Hemoglobin A1C:       Vitamin B12         RADIOLOGY    ANALYSIS AND PLAN:  A 73-year-old with episode of change in the mental status, history of subdural hematoma.  1.	For change in the mental status, suspect most likely secondary to progressive dementia along with underlying metabolic etiology, hypernatremia, and decreased oral intake.  Examination was limited but I do not see any clear signs to suggest new cerebrovascular accident had ensued.  2.	I would recommend IV fluid hydration.  3.	Monitor oral intake.  4.	Monitor sodium levels.  5.	For history of dementia, the patient did try medications in the past but appeared to have side effects secondary to the patient's age and it appeared to be advanced, did not feel that medications would be of any benefit.  6.	For history of subdural hematoma, as per my conversation with spouse, his last CT imaging showed had resolution of it.  7.	Spoke with spouse Elaine at bedside, her telephone number is [phone]. 12/10/18  She understands while in the hospital setting may become more disoriented.  8.	For agitation, continue the patient on his Depakote and Seroquel.  Haldol was added.  Make adjustments to medications as needed. Patient on Depakote as a mood stabilizer not for seizures ok to bojorquez to alt mood stabilizer psych noted plan to taper seroquel-- wll change depakote to liquid form   9.	psych follow up     Thank you for the courtesy of consultation.    Physical therapy evaluation as tolerated  OOB to chair/ambulation with assistance only if possible.    Greater than 40 minutes spent in direct patient care reviewing  the notes, lab data/ imaging , discussion with multidisciplinary team.

## 2018-12-11 NOTE — PROGRESS NOTE ADULT - PROBLEM SELECTOR PLAN 1
likely mostly related to advanced dementia with severe agitation ( patient is known to me and had behavioral issues and aggressive ,combative behavior even with normal labs and bmp few days ago ) Hypernatremia and bun/cr elevation could contribute to encephalopathy and changed mental state as well .No evidence of VIRI found ,

## 2018-12-11 NOTE — PROGRESS NOTE ADULT - PROBLEM SELECTOR PLAN 3
SERIAL BMP ,CONTINUE IVFS -D5 W , NEPHROLOGY EVAL ,wife refuses NGT AND GT ,patient " will pull out it any way during periods agitation " and combativenessPeriods of agitation reported ,refuses meals and pills .Prealbumin is 9 NUTRITIONIST CONSULT is appreciated  Palliative care consult requested ,to discuss advance directives/artificial nutrition  and complete MOLST Wife is refusing NGT/GT AND requests more guidance from PALL CARE TEAM .She considers CMO.

## 2018-12-11 NOTE — PROGRESS NOTE ADULT - PROBLEM SELECTOR PLAN 1
hypernatremia continue with water is improving   ACUTE RENAL FAILURE: due  decrease fluid intake continue with fluid   Serum creatinine is improving     , approximating GFR is improved    ml/min.   There is  progression . No uremic symptoms    No evidence of anemia .  Fluid status stable.  Will continue to avoid nephrotoxic drugs.  Patient remains asymptomatic.   Continue current therapy.

## 2018-12-12 LAB
ALBUMIN SERPL ELPH-MCNC: 1.8 G/DL — LOW (ref 3.3–5)
ALP SERPL-CCNC: 48 U/L — SIGNIFICANT CHANGE UP (ref 30–120)
ALT FLD-CCNC: 46 U/L DA — SIGNIFICANT CHANGE UP (ref 10–60)
ANION GAP SERPL CALC-SCNC: 5 MMOL/L — SIGNIFICANT CHANGE UP (ref 5–17)
AST SERPL-CCNC: 29 U/L — SIGNIFICANT CHANGE UP (ref 10–40)
BILIRUB SERPL-MCNC: 0.4 MG/DL — SIGNIFICANT CHANGE UP (ref 0.2–1.2)
BUN SERPL-MCNC: 9 MG/DL — SIGNIFICANT CHANGE UP (ref 7–23)
CALCIUM SERPL-MCNC: 8.5 MG/DL — SIGNIFICANT CHANGE UP (ref 8.4–10.5)
CHLORIDE SERPL-SCNC: 105 MMOL/L — SIGNIFICANT CHANGE UP (ref 96–108)
CO2 SERPL-SCNC: 34 MMOL/L — HIGH (ref 22–31)
CREAT SERPL-MCNC: 1.06 MG/DL — SIGNIFICANT CHANGE UP (ref 0.5–1.3)
GLUCOSE SERPL-MCNC: 111 MG/DL — HIGH (ref 70–99)
HCT VFR BLD CALC: 38.1 % — LOW (ref 39–50)
HGB BLD-MCNC: 12.2 G/DL — LOW (ref 13–17)
MCHC RBC-ENTMCNC: 29.7 PG — SIGNIFICANT CHANGE UP (ref 27–34)
MCHC RBC-ENTMCNC: 32 GM/DL — SIGNIFICANT CHANGE UP (ref 32–36)
MCV RBC AUTO: 92.7 FL — SIGNIFICANT CHANGE UP (ref 80–100)
NRBC # BLD: 0 /100 WBCS — SIGNIFICANT CHANGE UP (ref 0–0)
PLATELET # BLD AUTO: 272 K/UL — SIGNIFICANT CHANGE UP (ref 150–400)
POTASSIUM SERPL-MCNC: 3.9 MMOL/L — SIGNIFICANT CHANGE UP (ref 3.5–5.3)
POTASSIUM SERPL-SCNC: 3.9 MMOL/L — SIGNIFICANT CHANGE UP (ref 3.5–5.3)
PROT SERPL-MCNC: 6.1 G/DL — SIGNIFICANT CHANGE UP (ref 6–8.3)
RBC # BLD: 4.11 M/UL — LOW (ref 4.2–5.8)
RBC # FLD: 13.5 % — SIGNIFICANT CHANGE UP (ref 10.3–14.5)
SODIUM SERPL-SCNC: 144 MMOL/L — SIGNIFICANT CHANGE UP (ref 135–145)
WBC # BLD: 12.06 K/UL — HIGH (ref 3.8–10.5)
WBC # FLD AUTO: 12.06 K/UL — HIGH (ref 3.8–10.5)

## 2018-12-12 RX ORDER — PIPERACILLIN AND TAZOBACTAM 4; .5 G/20ML; G/20ML
3.38 INJECTION, POWDER, LYOPHILIZED, FOR SOLUTION INTRAVENOUS EVERY 8 HOURS
Refills: 0 | Status: DISCONTINUED | OUTPATIENT
Start: 2018-12-13 | End: 2018-12-14

## 2018-12-12 RX ORDER — PIPERACILLIN AND TAZOBACTAM 4; .5 G/20ML; G/20ML
3.38 INJECTION, POWDER, LYOPHILIZED, FOR SOLUTION INTRAVENOUS ONCE
Refills: 0 | Status: COMPLETED | OUTPATIENT
Start: 2018-12-12 | End: 2018-12-12

## 2018-12-12 RX ADMIN — FAMOTIDINE 20 MILLIGRAM(S): 10 INJECTION INTRAVENOUS at 09:18

## 2018-12-12 RX ADMIN — Medication 500 MILLIGRAM(S): at 09:10

## 2018-12-12 RX ADMIN — RISPERIDONE 1 MILLIGRAM(S): 4 TABLET ORAL at 09:11

## 2018-12-12 RX ADMIN — POLYETHYLENE GLYCOL 3350 17 GRAM(S): 17 POWDER, FOR SOLUTION ORAL at 05:56

## 2018-12-12 RX ADMIN — RISPERIDONE 1 MILLIGRAM(S): 4 TABLET ORAL at 14:45

## 2018-12-12 RX ADMIN — SENNA PLUS 2 TABLET(S): 8.6 TABLET ORAL at 21:28

## 2018-12-12 RX ADMIN — Medication 500 MILLIGRAM(S): at 14:45

## 2018-12-12 RX ADMIN — PREGABALIN 500 MICROGRAM(S): 225 CAPSULE ORAL at 09:18

## 2018-12-12 RX ADMIN — HEPARIN SODIUM 5000 UNIT(S): 5000 INJECTION INTRAVENOUS; SUBCUTANEOUS at 17:00

## 2018-12-12 RX ADMIN — TAMSULOSIN HYDROCHLORIDE 0.8 MILLIGRAM(S): 0.4 CAPSULE ORAL at 21:28

## 2018-12-12 RX ADMIN — HEPARIN SODIUM 5000 UNIT(S): 5000 INJECTION INTRAVENOUS; SUBCUTANEOUS at 05:56

## 2018-12-12 RX ADMIN — PIPERACILLIN AND TAZOBACTAM 200 GRAM(S): 4; .5 INJECTION, POWDER, LYOPHILIZED, FOR SOLUTION INTRAVENOUS at 20:53

## 2018-12-12 RX ADMIN — SODIUM CHLORIDE 50 MILLILITER(S): 9 INJECTION, SOLUTION INTRAVENOUS at 20:53

## 2018-12-12 RX ADMIN — POLYETHYLENE GLYCOL 3350 17 GRAM(S): 17 POWDER, FOR SOLUTION ORAL at 17:00

## 2018-12-12 RX ADMIN — QUETIAPINE FUMARATE 100 MILLIGRAM(S): 200 TABLET, FILM COATED ORAL at 21:28

## 2018-12-12 RX ADMIN — Medication 1000 MILLIGRAM(S): at 21:29

## 2018-12-12 NOTE — PROGRESS NOTE ADULT - NSHPATTENDINGPLANDISCUSS_GEN_ALL_CORE
med staff , Dr King ,spoke to the wife Elaine on a phone [phone] 12/10 ,was not able to reach her yesterday ,will call today .Case d/w [facility] care RN Mikki

## 2018-12-12 NOTE — SWALLOW BEDSIDE ASSESSMENT ADULT - SWALLOW EVAL: RECOMMENDED FEEDING/EATING TECHNIQUES
allow for swallow between intakes/alternate food with liquid/check mouth frequently for oral residue/pocketing/crush medication (when feasible)

## 2018-12-12 NOTE — PROGRESS NOTE ADULT - PROBLEM SELECTOR PLAN 1
likely mostly related to advanced dementia with severe agitation ( patient is known to me and had behavioral issues and aggressive ,combative behavior even with normal labs and bmp few days ago ) Hypernatremia and bun/cr elevation could contribute to encephalopathy and changed mental state as well .No evidence of VIRI found .Due to elevation of WBC septic workup sent

## 2018-12-12 NOTE — PROGRESS NOTE ADULT - SUBJECTIVE AND OBJECTIVE BOX
Neurology follow up note    NILES [id number]      Interval History:    Patient attempting to be feed poor po intake resists     MEDICATIONS    acetaminophen   Tablet .. 650 milliGRAM(s) Oral every 6 hours PRN  cyanocobalamin 500 MICROGram(s) Oral daily  dextrose 5%. 1000 milliLiter(s) IV Continuous <Continuous>  famotidine    Tablet 20 milliGRAM(s) Oral daily  haloperidol    Injectable 3 milliGRAM(s) IntraMuscular every 4 hours PRN  heparin  Injectable 5000 Unit(s) SubCutaneous every 12 hours  polyethylene glycol 3350 17 Gram(s) Oral two times a day  QUEtiapine 100 milliGRAM(s) Oral at bedtime  risperiDONE  Disintegrating Tablet 1 milliGRAM(s) Oral daily  risperiDONE  Disintegrating Tablet 1 milliGRAM(s) Oral <User Schedule>  senna 2 Tablet(s) Oral at bedtime  tamsulosin 0.8 milliGRAM(s) Oral at bedtime  traMADol 25 milliGRAM(s) Oral every 6 hours PRN  valproic  acid Syrup 500 milliGRAM(s) Oral <User Schedule>  valproic  acid Syrup 1000 milliGRAM(s) Oral at bedtime      Allergies    No Known Allergies    Intolerances            Vital Signs Last 24 Hrs  T(C): 36.4 (12 Dec 2018 05:37), Max: 37 (12 Dec 2018 01:05)  T(F): 97.6 (12 Dec 2018 05:37), Max: 98.6 (12 Dec 2018 01:05)  HR: 88 (12 Dec 2018 07:37) (85 - 98)  BP: 110/72 (12 Dec 2018 05:37) (100/68 - 152/85)  BP(mean): --  RR: 18 (12 Dec 2018 05:37) (18 - 18)  SpO2: 97% (12 Dec 2018 07:37) (95% - 98%)    REVIEW OF SYSTEMS: Limited or unable to obtain secondary to patient's poor mental status.    On Neurological Examination:  The patient is arousable to verbal stimuli, opens eyes.    Extraocular movements were intact.  The patient will say irrelevant things.    Speech was fluent.  No dysarthria noted.    Motor, bilateral upper appeared to be 4/5.    Bilateral lower, plantar stimulation and flexation of the hip and knee was 3/5.    Did not notice any gross focality.    Exam is limited secondary to the patient not following commands.    Does not follow commands    GENERAL Exam: Nontoxic , No Acute Distress   	  HEENT:  normocephalic, atraumatic  		  LUNGS: Clear bilaterally    	  HEART: Normal S1S2   No murmur RRR        	  GI/ ABDOMEN:  Soft  Non tender    EXTREMITIES:   No Edema  No Clubbing  No Cyanosis     MUSCULOSKELETAL: decreased Range of Motion all 4 extremities   	   SKIN: Normal  No Ecchymosis               LABS:  CBC Full  -  ( 12 Dec 2018 06:43 )  WBC Count : 12.06 K/uL  Hemoglobin : 12.2 g/dL  Hematocrit : 38.1 %  Platelet Count - Automated : 272 K/uL  Mean Cell Volume : 92.7 fl  Mean Cell Hemoglobin : 29.7 pg  Mean Cell Hemoglobin Concentration : 32.0 gm/dL  Auto Neutrophil # : x  Auto Lymphocyte # : x  Auto Monocyte # : x  Auto Eosinophil # : x  Auto Basophil # : x  Auto Neutrophil % : x  Auto Lymphocyte % : x  Auto Monocyte % : x  Auto Eosinophil % : x  Auto Basophil % : x    Urinalysis Basic - ( 11 Dec 2018 16:55 )    Color: Yellow / Appearance: Slightly Turbid / S.010 / pH: x  Gluc: x / Ketone: Negative  / Bili: Negative / Urobili: 8 mg/dL   Blood: x / Protein: 30 mg/dL / Nitrite: Positive   Leuk Esterase: Moderate / RBC: 25-50 /HPF / WBC >50   Sq Epi: x / Non Sq Epi: Few / Bacteria: Many          144  |  105  |  9   ----------------------------<  111<H>  3.9   |  34<H>  |  1.06    Ca    8.5      12 Dec 2018 06:43    TPro  6.1  /  Alb  1.8<L>  /  TBili  0.4  /  DBili  x   /  AST  29  /  ALT  46  /  AlkPhos  48  12-12    Hemoglobin A1C:     LIVER FUNCTIONS - ( 12 Dec 2018 06:43 )  Alb: 1.8 g/dL / Pro: 6.1 g/dL / ALK PHOS: 48 U/L / ALT: 46 U/L DA / AST: 29 U/L / GGT: x           Vitamin B12         RADIOLOGY      ANALYSIS AND PLAN:  A 73-year-old with episode of change in the mental status, history of subdural hematoma.  1.	For change in the mental status, suspect most likely secondary to progressive dementia along with underlying metabolic etiology, hypernatremia, and decreased oral intake.  Examination was limited but I do not see any clear signs to suggest new cerebrovascular accident had ensued.  2.	I would recommend IV fluid hydration.  3.	Monitor oral intake.  4.	Monitor sodium levels.  5.	For history of dementia, the patient did try medications in the past but appeared to have side effects secondary to the patient's age and it appeared to be advanced, did not feel that medications would be of any benefit.  6.	For history of subdural hematoma, as per my conversation with spouse, his last CT imaging showed had resolution of it.  7.	Spoke with spouse Elaine at bedside, her telephone number is [phone]. 12/10/18  She understands while in the hospital setting may become more disoriented.  8.	For agitation, continue the patient on his Depakote and Seroquel.  Haldol was added.  Make adjustments to medications as needed. Patient on Depakote as a mood stabilizer not for seizures ok to bojorquez to alt mood stabilizer psych noted plan to taper seroquel-- wll change depakote to liquid form   9.	psych follow up   10.	poor po intake     Thank you for the courtesy of consultation.    Physical therapy evaluation as tolerated  OOB to chair/ambulation with assistance only if possible.    Greater than 40 minutes spent in direct patient care reviewing  the notes, lab data/ imaging , discussion with multidisciplinary team.

## 2018-12-12 NOTE — PROGRESS NOTE ADULT - SUBJECTIVE AND OBJECTIVE BOX
Patient is a 73y Male whom presented to the hospital with ckd and haleigh . hypernatremia   pt seen in am nad   PAST MEDICAL & SURGICAL HISTORY:  Alzheimer disease  Urinary retention  BPH (benign prostatic hyperplasia)  Constipation  HTN (hypertension)  SDH (subdural hematoma)  HARISH (obstructive sleep apnea)  GERD (gastroesophageal reflux disease)  Agitation  Dementia  ASHD (arteriosclerotic heart disease)  Gout      MEDICATIONS  (STANDING):  cyanocobalamin 500 MICROGram(s) Oral daily  dextrose 5%. 1000 milliLiter(s) (100 mL/Hr) IV Continuous <Continuous>  diVALproex  milliGRAM(s) Oral <User Schedule>  diVALproex DR 1000 milliGRAM(s) Oral at bedtime  famotidine    Tablet 20 milliGRAM(s) Oral daily  heparin  Injectable 5000 Unit(s) SubCutaneous every 12 hours  polyethylene glycol 3350 17 Gram(s) Oral two times a day  QUEtiapine 100 milliGRAM(s) Oral at bedtime  senna 2 Tablet(s) Oral at bedtime  tamsulosin 0.8 milliGRAM(s) Oral at bedtime        REVIEW OF SYSTEMS:    unable to obtaind a good ros                                                                                                                                                     12.2   12. )-----------( 272      ( 12 Dec 2018 06:43 )             38.1       CBC Full  -  ( 12 Dec 2018 06:43 )  WBC Count : 12.06 K/uL  Hemoglobin : 12.2 g/dL  Hematocrit : 38.1 %  Platelet Count - Automated : 272 K/uL  Mean Cell Volume : 92.7 fl  Mean Cell Hemoglobin : 29.7 pg  Mean Cell Hemoglobin Concentration : 32.0 gm/dL  Auto Neutrophil # : x  Auto Lymphocyte # : x  Auto Monocyte # : x  Auto Eosinophil # : x  Auto Basophil # : x  Auto Neutrophil % : x  Auto Lymphocyte % : x  Auto Monocyte % : x  Auto Eosinophil % : x  Auto Basophil % : x          144  |  105  |  9   ----------------------------<  111<H>  3.9   |  34<H>  |  1.06    Ca    8.5      12 Dec 2018 06:43    TPro  6.1  /  Alb  1.8<L>  /  TBili  0.4  /  DBili  x   /  AST  29  /  ALT  46  /  AlkPhos  48  12-12      CAPILLARY BLOOD GLUCOSE                                12.2   12. )-----------( 272      ( 12 Dec 2018 06:43 )             38.1       CBC Full  -  ( 12 Dec 2018 06:43 )  WBC Count : 12.06 K/uL  Hemoglobin : 12.2 g/dL  Hematocrit : 38.1 %  Platelet Count - Automated : 272 K/uL  Mean Cell Volume : 92.7 fl  Mean Cell Hemoglobin : 29.7 pg  Mean Cell Hemoglobin Concentration : 32.0 gm/dL  Auto Neutrophil # : x  Auto Lymphocyte # : x  Auto Monocyte # : x  Auto Eosinophil # : x  Auto Basophil # : x  Auto Neutrophil % : x  Auto Lymphocyte % : x  Auto Monocyte % : x  Auto Eosinophil % : x  Auto Basophil % : x      1212    144  |  105  |  9   ----------------------------<  111<H>  3.9   |  34<H>  |  1.06    Ca    8.5      12 Dec 2018 06:43    TPro  6.1  /  Alb  1.8<L>  /  TBili  0.4  /  DBili  x   /  AST  29  /  ALT  46  /  AlkPhos  48  12      CAPILLARY BLOOD GLUCOSE          Vital Signs Last 24 Hrs  T(C): 36.7 (12 Dec 2018 09:44), Max: 37 (12 Dec 2018 01:05)  T(F): 98 (12 Dec 2018 09:44), Max: 98.6 (12 Dec 2018 01:05)  HR: 70 (12 Dec 2018 09:44) (70 - 98)  BP: 100/70 (12 Dec 2018 09:44) (100/68 - 152/85)  BP(mean): --  RR: 18 (12 Dec 2018 09:44) (18 - 18)  SpO2: 90% (12 Dec 2018 09:44) (90% - 98%)    Urinalysis Basic - ( 11 Dec 2018 16:55 )    Color: Yellow / Appearance: Slightly Turbid / S.010 / pH: x  Gluc: x / Ketone: Negative  / Bili: Negative / Urobili: 8 mg/dL   Blood: x / Protein: 30 mg/dL / Nitrite: Positive   Leuk Esterase: Moderate / RBC: 25-50 /HPF / WBC >50   Sq Epi: x / Non Sq Epi: Few / Bacteria: Many        Vital Signs Last 24 Hrs  T(C): 36.7 (12 Dec 2018 09:44), Max: 37 (12 Dec 2018 01:05)  T(F): 98 (12 Dec 2018 09:44), Max: 98.6 (12 Dec 2018 01:05)  HR: 70 (12 Dec 2018 09:44) (70 - 98)  BP: 100/70 (12 Dec 2018 09:44) (100/68 - 152/85)  BP(mean): --  RR: 18 (12 Dec 2018 09:44) (18 - 18)  SpO2: 90% (12 Dec 2018 09:44) (90% - 98%)    Urinalysis Basic - ( 11 Dec 2018 16:55 )    Color: Yellow / Appearance: Slightly Turbid / S.010 / pH: x  Gluc: x / Ketone: Negative  / Bili: Negative / Urobili: 8 mg/dL   Blood: x / Protein: 30 mg/dL / Nitrite: Positive   Leuk Esterase: Moderate / RBC: 25-50 /HPF / WBC >50   Sq Epi: x / Non Sq Epi: Few / Bacteria: Many                      PHYSICAL EXAM:    Constitutional: NAD  HEENT: conjunctive   clear   Neck:  No JVD  Respiratory: decrease bs b/l   Cardiovascular: S1 and S2  Gastrointestinal: BS+, soft, NT/ND  Extremities: No peripheral edema

## 2018-12-12 NOTE — SWALLOW BEDSIDE ASSESSMENT ADULT - COMMENTS
Pt lethargic, confused. Pt admitted with dehydration, combativeness, decreased PO intake. Pt presents with oropharyngeal dysphagia with reduced oral prep, reduced manipulation of the bolus, oral holding and pocketing of the bolus, latent AP transport, swallow delay. Overt s/s of aspiration for thin liquids. Recommend dysphagia 1 puree consistencies with nectar thickened liquids, 1:1. Discussed with RN, MD, Dietician.

## 2018-12-12 NOTE — SWALLOW BEDSIDE ASSESSMENT ADULT - ORAL PHASE
Decreased anterior-posterior movement of the bolus/Delayed oral transit time pocketing/Decreased anterior-posterior movement of the bolus/Delayed oral transit time

## 2018-12-12 NOTE — PROGRESS NOTE ADULT - SUBJECTIVE AND OBJECTIVE BOX
WILLIAM CAGE [facility] S [phone]   CONTACT Sp Elaine ANAYA [phone] self [phone]   ALLERGY nka   REASON FOR VISIT     Subsequent pulmonary followup  Initial evaluation/Pulmonary Critical Care consultation requested on 12/6/2018  by Dr Marcos   from Dr Brothers   Patient examined chart reviewed  HOSPITAL ADMISSION [facility] S 12/6/2018    PATIENT CAME  FROM (if information available)      IVF  D5 100 (12/6) ch D5 75 (12/6) ch D5 50 (12/9)    VITALS/LABS      12/12/2018 afeb 96470/65 20 95%  12/12/2018 W 12 Hb 12.2 Plt 272 Na 144 K 3.9 CO2 24 Cr 1     REVIEW OF SYMPTOMS    Able to give ROS  NO     PHYSICAL EXAM    HEENT Unremarkable PERRLA atraumatic   RESP Fair air entry EXP prolonged    Harsh breath sound Resp distres mild   CARDIAC S1 S2 No S3     NO JVD    ABDOMEN SOFT BS PRESENT NOT DISTENDED No hepatosplenomegaly PEDAL EDEMA present No calf tenderness  NO rash   GENERAL Not TOXIC looking    GLOBAL ISSUE/BEST PRACTICE:      PROBLEM: HOB elevation:   y            PROBLEM: Stress ulcer proph:    pepcid 20 (12/6)                       PROBLEM: VTE prophylaxis:      hsc (12/6)   PROBLEM: Glycemic control:    na  PROBLEM: Nutrition:    soft DASH (12/6)   PROBLEM: Advanced directive: na     PROBLEM: Allergies:  na    PATIENT DESCRIPTION PATIENT WILLIAM CAGE  12/6/2018  ADMISSION [facility] S  DR GAMALIEL STEINBERG            72 y/o M pt w/ PMHx Alzheimer's disease, subdural hematoma presents to the ED BIBA from [facility] for evaluation of agitation. Pt sent to ED by PMD for eval of agitation. admitted 12/6/2018Pt unable to provide hx due to pt's hx of Alzheimer's.   Pt was noted to be hypernatremic Pulm consulted 12/6/2018 because pt has HO HARISH     PROBLEM SPECIFIC PATIENT DATA  PATIENT WILLIAM CAGE  12/6/2018  ADMISSION [facility] S  DR GAMALIEL STEINBERG       DEHYDRATION HYPERNATREMIA  D5 100 (12/6)  ch D5 75 (12/6)   12/6-12/7-12/8-12/9-12/11 Na [phone]  HARISH   CPAP 10 (12/5) ordered empirically   12/7/2018 DW pt spouse last night She agreed that pt likely will not keep cpap on   NEUROPSYCHOTROPIC   Quetiapine 100 (12/6)   depakote 500.2 (12/6)   depakote 1000 (12/6)   Haldol 1.4p (12/6)   BPH  Tamsulosin .4 (12/6)   INFECTION   12/11 CXR   12/11 CT ch L basal ssa or pneumonia   12/11 u 100K E coli Nitr pos Mod L estrase Large bld   12/11 ua Over 50 w          ASSESSMENT/RECOMMENDATIONS PATIENT WILLIAM CAGE  12/6/2018  ADMISSION [facility] S  DR ALSTON SCMUTER       DEHYDRATION HYPERNATREMIA  Dehydration resolving Hypernatremia improving   HARISH   Patient not using CPAP but has remained clinically stable   LEUKOCYTOSIS   12/12/2018 dw Dr Marcos Start zosyn to cover uti and asp pneum  NEUROPSYCHOTROPIC   On meds agitation improved   BPH  Tamsulosin .4 (12/6)          TIME SPENT Over 25 minutes aggregate care time spent on encounter; activities included   direct patient care, counseling and/or coordinating care reviewing notes, lab data/ imaging , discussion with multidisciplinary team/ patient  /family. Risks, benefits, alternatives  discussed in detail.

## 2018-12-12 NOTE — PROGRESS NOTE ADULT - SUBJECTIVE AND OBJECTIVE BOX
PROGRESS NOTE  Patient is a 73y old  Male who presents with a chief complaint of poor po intake and lethargy (11 Dec 2018 17:29)  Chart and available morning labs /imaging are reviewed electronically , urgent issues addressed . More information  is being added upon completion of rounds , when more information is collected and management discussed with consultants , medical staff and social service/case management on the floor   OVERNIGHT  .Confused ,poor mentation .No distress noted WBC is down to 12   Poor cooperation with med staff ,refuses most of meals and oral medications .Wife considers CMO ,but didnt make a final decision yet .Palliative care input requested . As per aid at bedside patient didnt have breakfast and lunch today .Nutritionist input is appreciated ,supplements are ordered ,but patient is not able to take .WBC elevation is noted and CTT is ordered by pulm Dr  HPI:  72 yo AAM with hx of ALZHEIMER'S DISEASE ,SUBDURAL HEMATOMA, HTN, CAD ,GERD , HARISH ,VIT B12 DEFICIENCY ,GOUT ,CONSTIPATION .brought to ED from [facility] due to poor po intake and lethargy ,worsening for 3 days . Patient refused iv line placement and labs due to severe agitation and dementia ,in [facility] he was  combative and aggressive with medical staff and psychiatric evaluation is  requested Admit for iv hydration, monitor renal profile and urine output ,nutritionist consult ,prealbumin level ,serial bmp, nutritional supplements, multivitamins ,palliative care evaluation  regarding MOLST completion and artificial nutrition discussion . (06 Dec 2018 15:20)    PAST MEDICAL & SURGICAL HISTORY:  Alzheimer disease  Urinary retention  BPH (benign prostatic hyperplasia)  Constipation  HTN (hypertension)  SDH (subdural hematoma)  HARISH (obstructive sleep apnea)  GERD (gastroesophageal reflux disease)  Agitation  Dementia  ASHD (arteriosclerotic heart disease)  Gout  MEDICATIONS  (STANDING):  cyanocobalamin 500 MICROGram(s) Oral daily  dextrose 5%. 1000 milliLiter(s) (50 mL/Hr) IV Continuous <Continuous>  famotidine    Tablet 20 milliGRAM(s) Oral daily  heparin  Injectable 5000 Unit(s) SubCutaneous every 12 hours  polyethylene glycol 3350 17 Gram(s) Oral two times a day  QUEtiapine 100 milliGRAM(s) Oral at bedtime  risperiDONE  Disintegrating Tablet 1 milliGRAM(s) Oral daily  risperiDONE  Disintegrating Tablet 1 milliGRAM(s) Oral <User Schedule>  senna 2 Tablet(s) Oral at bedtime  tamsulosin 0.8 milliGRAM(s) Oral at bedtime  valproic  acid Syrup 500 milliGRAM(s) Oral <User Schedule>  valproic  acid Syrup 1000 milliGRAM(s) Oral at bedtime    MEDICATIONS  (PRN):  acetaminophen   Tablet .. 650 milliGRAM(s) Oral every 6 hours PRN Temp greater or equal to 38C (100.4F), Mild Pain (1 - 3)  haloperidol    Injectable 3 milliGRAM(s) IntraMuscular every 4 hours PRN severe agitation like pulling lines  traMADol 25 milliGRAM(s) Oral every 6 hours PRN Moderate Pain (4 - 6)      OBJECTIVE    T(C): 36.4 (18 @ 05:37), Max: 37 (18 @ 01:05)  HR: 88 (18 @ 07:37) (85 - 98)  BP: 110/72 (18 @ 05:37) (100/68 - 152/85)  RR: 18 (18 @ 05:37) (18 - 18)  SpO2: 97% (18 @ 07:37) (95% - 98%)  Wt(kg): --  I&O's Summary    11 Dec 2018 07:01  -  12 Dec 2018 07:00  --------------------------------------------------------  IN: 1290 mL / OUT: 0 mL / NET: 1290 mL          REVIEW OF SYSTEMS:  ROS is unobtainable due to lethargy and confusion     PHYSICAL EXAM:  Appearance: NAD. VS past 24 hrs -as above   HEENT:   Moist oral mucosa. Conjunctiva clear b/l.   Neck : supple  Respiratory: Lungs CTAB.  Gastrointestinal:  Soft, nontender. No rebound. No rigidity. BS present	  Cardiovascular: RRR ,S1S2 present  Neurologic: Non-focal. Moving all extremities.  Extremities: No edema. No erythema. No calf tenderness.  Skin: No rashes, No ecchymoses, No cyanosis.	  wounds ,skin lesions-See skin assesment flow sheet   LABS:                        12.2    )-----------( 272      ( 12 Dec 2018 06:43 )             38.1         144  |  105  |  x   ----------------------------<  111<H>  3.9   |  34<H>  |  1.06    Ca    8.5      12 Dec 2018 06:43    TPro  6.1  /  Alb  1.8<L>  /  TBili  0.4  /  DBili  x   /  AST  29  /  ALT  46  /  AlkPhos  48      CAPILLARY BLOOD GLUCOSE          Urinalysis Basic - ( 11 Dec 2018 16:55 )    Color: Yellow / Appearance: Slightly Turbid / S.010 / pH: x  Gluc: x / Ketone: Negative  / Bili: Negative / Urobili: 8 mg/dL   Blood: x / Protein: 30 mg/dL / Nitrite: Positive   Leuk Esterase: Moderate / RBC: 25-50 /HPF / WBC >50   Sq Epi: x / Non Sq Epi: Few / Bacteria: Many        RADIOLOGY & ADDITIONAL TESTS:< from: CT Chest No Cont (18 @ 14:11) >  EXAM:  CT CHEST                                  PROCEDURE DATE:  2018          INTERPRETATION:  History: Pneumonia.    Noncontrast chest CT.    There are streaky fibrotic/atelectatic changes at the dependent lung   bases. There is additionalsmall patchy opacity in the left lower lobe   posteriorly which may represent subsegmental atelectasis and/or   pneumonia. Correlate clinically for active infection. No pleural   collections.  Central airways patent. No mediastinal adenopathy. Hilar evaluation   limited without IV contrast. Nonaneurysmal thoracic aorta. Normal heart   size with coronary artery calcification. Trace pericardial effusion   versus pericardial thickening.  Punctate nonobstructive left renal calculus.  Degenerative change dorsal spine.    Impression:  Left basilar subsegmental atelectasis and/or pneumonia.  Additional findings as discussed.    < end of copied text >     reviewed elctronically  ASSESSMENT/PLAN:

## 2018-12-12 NOTE — SWALLOW BEDSIDE ASSESSMENT ADULT - ASR SWALLOW ASPIRATION MONITOR
change of breathing pattern/cough/gurgly voice/fever/pneumonia/throat clearing/upper respiratory infection

## 2018-12-13 DIAGNOSIS — N50.89 OTHER SPECIFIED DISORDERS OF THE MALE GENITAL ORGANS: ICD-10-CM

## 2018-12-13 LAB
-  AMIKACIN: SIGNIFICANT CHANGE UP
-  AMOXICILLIN/CLAVULANIC ACID: SIGNIFICANT CHANGE UP
-  AMPICILLIN/SULBACTAM: SIGNIFICANT CHANGE UP
-  AMPICILLIN: SIGNIFICANT CHANGE UP
-  AZTREONAM: SIGNIFICANT CHANGE UP
-  CEFAZOLIN: SIGNIFICANT CHANGE UP
-  CEFEPIME: SIGNIFICANT CHANGE UP
-  CEFOXITIN: SIGNIFICANT CHANGE UP
-  CEFTRIAXONE: SIGNIFICANT CHANGE UP
-  CIPROFLOXACIN: SIGNIFICANT CHANGE UP
-  ERTAPENEM: SIGNIFICANT CHANGE UP
-  GENTAMICIN: SIGNIFICANT CHANGE UP
-  IMIPENEM: SIGNIFICANT CHANGE UP
-  LEVOFLOXACIN: SIGNIFICANT CHANGE UP
-  MEROPENEM: SIGNIFICANT CHANGE UP
-  NITROFURANTOIN: SIGNIFICANT CHANGE UP
-  PIPERACILLIN/TAZOBACTAM: SIGNIFICANT CHANGE UP
-  TIGECYCLINE: SIGNIFICANT CHANGE UP
-  TOBRAMYCIN: SIGNIFICANT CHANGE UP
-  TRIMETHOPRIM/SULFAMETHOXAZOLE: SIGNIFICANT CHANGE UP
ALBUMIN SERPL ELPH-MCNC: 2 G/DL — LOW (ref 3.3–5)
ALP SERPL-CCNC: 53 U/L — SIGNIFICANT CHANGE UP (ref 30–120)
ALT FLD-CCNC: 38 U/L DA — SIGNIFICANT CHANGE UP (ref 10–60)
ANION GAP SERPL CALC-SCNC: 4 MMOL/L — LOW (ref 5–17)
AST SERPL-CCNC: 23 U/L — SIGNIFICANT CHANGE UP (ref 10–40)
BILIRUB SERPL-MCNC: 0.5 MG/DL — SIGNIFICANT CHANGE UP (ref 0.2–1.2)
BUN SERPL-MCNC: 12 MG/DL — SIGNIFICANT CHANGE UP (ref 7–23)
CALCIUM SERPL-MCNC: 8.7 MG/DL — SIGNIFICANT CHANGE UP (ref 8.4–10.5)
CHLORIDE SERPL-SCNC: 104 MMOL/L — SIGNIFICANT CHANGE UP (ref 96–108)
CO2 SERPL-SCNC: 33 MMOL/L — HIGH (ref 22–31)
CREAT SERPL-MCNC: 1.12 MG/DL — SIGNIFICANT CHANGE UP (ref 0.5–1.3)
CULTURE RESULTS: SIGNIFICANT CHANGE UP
ERYTHROCYTE [SEDIMENTATION RATE] IN BLOOD: 83 MM/HR — HIGH (ref 0–9)
GLUCOSE SERPL-MCNC: 134 MG/DL — HIGH (ref 70–99)
HCT VFR BLD CALC: 37.4 % — LOW (ref 39–50)
HGB BLD-MCNC: 12.1 G/DL — LOW (ref 13–17)
INR BLD: 1.12 RATIO — SIGNIFICANT CHANGE UP (ref 0.88–1.16)
MCHC RBC-ENTMCNC: 30 PG — SIGNIFICANT CHANGE UP (ref 27–34)
MCHC RBC-ENTMCNC: 32.4 GM/DL — SIGNIFICANT CHANGE UP (ref 32–36)
MCV RBC AUTO: 92.6 FL — SIGNIFICANT CHANGE UP (ref 80–100)
METHOD TYPE: SIGNIFICANT CHANGE UP
NRBC # BLD: 0 /100 WBCS — SIGNIFICANT CHANGE UP (ref 0–0)
ORGANISM # SPEC MICROSCOPIC CNT: SIGNIFICANT CHANGE UP
ORGANISM # SPEC MICROSCOPIC CNT: SIGNIFICANT CHANGE UP
PLATELET # BLD AUTO: 299 K/UL — SIGNIFICANT CHANGE UP (ref 150–400)
POTASSIUM SERPL-MCNC: 4.3 MMOL/L — SIGNIFICANT CHANGE UP (ref 3.5–5.3)
POTASSIUM SERPL-SCNC: 4.3 MMOL/L — SIGNIFICANT CHANGE UP (ref 3.5–5.3)
PROCALCITONIN SERPL-MCNC: 0.42 NG/ML — HIGH (ref 0.02–0.1)
PROT SERPL-MCNC: 6.6 G/DL — SIGNIFICANT CHANGE UP (ref 6–8.3)
PROTHROM AB SERPL-ACNC: 12.2 SEC — SIGNIFICANT CHANGE UP (ref 10–12.9)
RBC # BLD: 4.04 M/UL — LOW (ref 4.2–5.8)
RBC # FLD: 13.5 % — SIGNIFICANT CHANGE UP (ref 10.3–14.5)
SODIUM SERPL-SCNC: 141 MMOL/L — SIGNIFICANT CHANGE UP (ref 135–145)
SPECIMEN SOURCE: SIGNIFICANT CHANGE UP
WBC # BLD: 12.43 K/UL — HIGH (ref 3.8–10.5)
WBC # FLD AUTO: 12.43 K/UL — HIGH (ref 3.8–10.5)

## 2018-12-13 PROCEDURE — 76870 US EXAM SCROTUM: CPT | Mod: 26

## 2018-12-13 PROCEDURE — 76857 US EXAM PELVIC LIMITED: CPT | Mod: 26

## 2018-12-13 RX ADMIN — PIPERACILLIN AND TAZOBACTAM 25 GRAM(S): 4; .5 INJECTION, POWDER, LYOPHILIZED, FOR SOLUTION INTRAVENOUS at 21:34

## 2018-12-13 RX ADMIN — SENNA PLUS 2 TABLET(S): 8.6 TABLET ORAL at 21:35

## 2018-12-13 RX ADMIN — POLYETHYLENE GLYCOL 3350 17 GRAM(S): 17 POWDER, FOR SOLUTION ORAL at 17:22

## 2018-12-13 RX ADMIN — PIPERACILLIN AND TAZOBACTAM 25 GRAM(S): 4; .5 INJECTION, POWDER, LYOPHILIZED, FOR SOLUTION INTRAVENOUS at 12:38

## 2018-12-13 RX ADMIN — SODIUM CHLORIDE 50 MILLILITER(S): 9 INJECTION, SOLUTION INTRAVENOUS at 09:06

## 2018-12-13 RX ADMIN — Medication 500 MILLIGRAM(S): at 13:32

## 2018-12-13 RX ADMIN — HEPARIN SODIUM 5000 UNIT(S): 5000 INJECTION INTRAVENOUS; SUBCUTANEOUS at 05:21

## 2018-12-13 RX ADMIN — POLYETHYLENE GLYCOL 3350 17 GRAM(S): 17 POWDER, FOR SOLUTION ORAL at 05:22

## 2018-12-13 RX ADMIN — TAMSULOSIN HYDROCHLORIDE 0.8 MILLIGRAM(S): 0.4 CAPSULE ORAL at 21:35

## 2018-12-13 RX ADMIN — RISPERIDONE 1 MILLIGRAM(S): 4 TABLET ORAL at 13:32

## 2018-12-13 RX ADMIN — RISPERIDONE 1 MILLIGRAM(S): 4 TABLET ORAL at 07:58

## 2018-12-13 RX ADMIN — FAMOTIDINE 20 MILLIGRAM(S): 10 INJECTION INTRAVENOUS at 12:34

## 2018-12-13 RX ADMIN — Medication 1000 MILLIGRAM(S): at 21:36

## 2018-12-13 RX ADMIN — QUETIAPINE FUMARATE 100 MILLIGRAM(S): 200 TABLET, FILM COATED ORAL at 21:35

## 2018-12-13 RX ADMIN — Medication 500 MILLIGRAM(S): at 07:58

## 2018-12-13 RX ADMIN — PREGABALIN 500 MICROGRAM(S): 225 CAPSULE ORAL at 12:34

## 2018-12-13 RX ADMIN — PIPERACILLIN AND TAZOBACTAM 25 GRAM(S): 4; .5 INJECTION, POWDER, LYOPHILIZED, FOR SOLUTION INTRAVENOUS at 05:21

## 2018-12-13 RX ADMIN — HEPARIN SODIUM 5000 UNIT(S): 5000 INJECTION INTRAVENOUS; SUBCUTANEOUS at 17:23

## 2018-12-13 NOTE — CONSULT NOTE ADULT - CONSULT REASON
Leukocytosis possible aspiration pn
ckd and haleigh , hypernatremia
sleep apnea
uti swollen testicle

## 2018-12-13 NOTE — PROGRESS NOTE ADULT - SUBJECTIVE AND OBJECTIVE BOX
Patient is a 73y Male whom presented to the hospital with ckd and haleigh . hypernatremia   pt seen in am nad   PAST MEDICAL & SURGICAL HISTORY:  Alzheimer disease  Urinary retention  BPH (benign prostatic hyperplasia)  Constipation  HTN (hypertension)  SDH (subdural hematoma)  HARISH (obstructive sleep apnea)  GERD (gastroesophageal reflux disease)  Agitation  Dementia  ASHD (arteriosclerotic heart disease)  Gout      MEDICATIONS  (STANDING):  cyanocobalamin 500 MICROGram(s) Oral daily  dextrose 5%. 1000 milliLiter(s) (100 mL/Hr) IV Continuous <Continuous>  diVALproex  milliGRAM(s) Oral <User Schedule>  diVALproex DR 1000 milliGRAM(s) Oral at bedtime  famotidine    Tablet 20 milliGRAM(s) Oral daily  heparin  Injectable 5000 Unit(s) SubCutaneous every 12 hours  polyethylene glycol 3350 17 Gram(s) Oral two times a day  QUEtiapine 100 milliGRAM(s) Oral at bedtime  senna 2 Tablet(s) Oral at bedtime  tamsulosin 0.8 milliGRAM(s) Oral at bedtime        REVIEW OF SYSTEMS:    unable to obtaind a good ros                                                                                                                                                                  12.1   12.43 )-----------( 299      ( 13 Dec 2018 08:01 )             37.4       CBC Full  -  ( 13 Dec 2018 08:01 )  WBC Count : 12.43 K/uL  Hemoglobin : 12.1 g/dL  Hematocrit : 37.4 %  Platelet Count - Automated : 299 K/uL  Mean Cell Volume : 92.6 fl  Mean Cell Hemoglobin : 30.0 pg  Mean Cell Hemoglobin Concentration : 32.4 gm/dL  Auto Neutrophil # : x  Auto Lymphocyte # : x  Auto Monocyte # : x  Auto Eosinophil # : x  Auto Basophil # : x  Auto Neutrophil % : x  Auto Lymphocyte % : x  Auto Monocyte % : x  Auto Eosinophil % : x  Auto Basophil % : x      12-13    141  |  104  |  12  ----------------------------<  134<H>  4.3   |  33<H>  |  1.12    Ca    8.7      13 Dec 2018 08:01    TPro  6.6  /  Alb  2.0<L>  /  TBili  0.5  /  DBili  x   /  AST  23  /  ALT  38  /  AlkPhos  53  12-13      CAPILLARY BLOOD GLUCOSE          Vital Signs Last 24 Hrs  T(C): 36.9 (13 Dec 2018 17:23), Max: 36.9 (12 Dec 2018 21:10)  T(F): 98.5 (13 Dec 2018 17:23), Max: 98.5 (12 Dec 2018 21:10)  HR: 92 (13 Dec 2018 17:23) (71 - 94)  BP: 122/78 (13 Dec 2018 17:23) (98/58 - 164/73)  BP(mean): --  RR: 19 (13 Dec 2018 17:23) (18 - 20)  SpO2: 98% (13 Dec 2018 17:23) (95% - 98%)        PT/INR - ( 13 Dec 2018 08:01 )   PT: 12.2 sec;   INR: 1.12 ratio                         PHYSICAL EXAM:    Constitutional: NAD  HEENT: conjunctive   clear   Neck:  No JVD  Respiratory: decrease bs b/l   Cardiovascular: S1 and S2  Gastrointestinal: BS+, soft, NT/ND  Extremities: No peripheral edema

## 2018-12-13 NOTE — PROGRESS NOTE ADULT - SUBJECTIVE AND OBJECTIVE BOX
Neurology follow up note    NILES [id number]      Interval History:    Patient resting in bed     MEDICATIONS    acetaminophen   Tablet .. 650 milliGRAM(s) Oral every 6 hours PRN  cyanocobalamin 500 MICROGram(s) Oral daily  dextrose 5%. 1000 milliLiter(s) IV Continuous <Continuous>  famotidine    Tablet 20 milliGRAM(s) Oral daily  haloperidol    Injectable 3 milliGRAM(s) IntraMuscular every 4 hours PRN  heparin  Injectable 5000 Unit(s) SubCutaneous every 12 hours  piperacillin/tazobactam IVPB. 3.375 Gram(s) IV Intermittent every 8 hours  polyethylene glycol 3350 17 Gram(s) Oral two times a day  QUEtiapine 100 milliGRAM(s) Oral at bedtime  risperiDONE  Disintegrating Tablet 1 milliGRAM(s) Oral daily  risperiDONE  Disintegrating Tablet 1 milliGRAM(s) Oral <User Schedule>  senna 2 Tablet(s) Oral at bedtime  tamsulosin 0.8 milliGRAM(s) Oral at bedtime  traMADol 25 milliGRAM(s) Oral every 6 hours PRN  valproic  acid Syrup 500 milliGRAM(s) Oral <User Schedule>  valproic  acid Syrup 1000 milliGRAM(s) Oral at bedtime      Allergies    No Known Allergies    Intolerances            Vital Signs Last 24 Hrs  T(C): 36.9 (13 Dec 2018 05:20), Max: 36.9 (12 Dec 2018 21:10)  T(F): 98.5 (13 Dec 2018 05:20), Max: 98.5 (12 Dec 2018 21:10)  HR: 86 (13 Dec 2018 08:13) (66 - 94)  BP: 164/73 (13 Dec 2018 05:20) (98/58 - 164/73)  BP(mean): --  RR: 19 (13 Dec 2018 05:20) (18 - 20)  SpO2: 97% (13 Dec 2018 08:13) (90% - 97%)    REVIEW OF SYSTEMS: Limited or unable to obtain secondary to patient's poor mental status.    On Neurological Examination:  The patient is arousable to verbal stimuli, opens eyes.    Extraocular movements were intact.  The patient will say irrelevant things.    Speech was fluent.  No dysarthria noted.    Motor, bilateral upper appeared to be 4/5.    Bilateral lower, plantar stimulation and flexation of the hip and knee was 3/5.    Did not notice any gross focality.    Exam is limited secondary to the patient not following commands.    Does not follow commands    GENERAL Exam: Nontoxic , No Acute Distress   	  HEENT:  normocephalic, atraumatic  		  LUNGS: Clear bilaterally    	  HEART: Normal S1S2   No murmur RRR        	  GI/ ABDOMEN:  Soft  Non tender    EXTREMITIES:   No Edema  No Clubbing  No Cyanosis     MUSCULOSKELETAL: decreased Range of Motion all 4 extremities   	   SKIN: Normal  No Ecchymosis               LABS:  CBC Full  -  ( 13 Dec 2018 08:01 )  WBC Count : 12.43 K/uL  Hemoglobin : 12.1 g/dL  Hematocrit : 37.4 %  Platelet Count - Automated : 299 K/uL  Mean Cell Volume : 92.6 fl  Mean Cell Hemoglobin : 30.0 pg  Mean Cell Hemoglobin Concentration : 32.4 gm/dL  Auto Neutrophil # : x  Auto Lymphocyte # : x  Auto Monocyte # : x  Auto Eosinophil # : x  Auto Basophil # : x  Auto Neutrophil % : x  Auto Lymphocyte % : x  Auto Monocyte % : x  Auto Eosinophil % : x  Auto Basophil % : x    Urinalysis Basic - ( 11 Dec 2018 16:55 )    Color: Yellow / Appearance: Slightly Turbid / S.010 / pH: x  Gluc: x / Ketone: Negative  / Bili: Negative / Urobili: 8 mg/dL   Blood: x / Protein: 30 mg/dL / Nitrite: Positive   Leuk Esterase: Moderate / RBC: 25-50 /HPF / WBC >50   Sq Epi: x / Non Sq Epi: Few / Bacteria: Many      12-    141  |  104  |  12  ----------------------------<  134<H>  4.3   |  33<H>  |  1.12    Ca    8.7      13 Dec 2018 08:01    TPro  6.6  /  Alb  2.0<L>  /  TBili  0.5  /  DBili  x   /  AST  23  /  ALT  38  /  AlkPhos  53  12-13    Hemoglobin A1C:     LIVER FUNCTIONS - ( 13 Dec 2018 08:01 )  Alb: 2.0 g/dL / Pro: 6.6 g/dL / ALK PHOS: 53 U/L / ALT: 38 U/L DA / AST: 23 U/L / GGT: x           Vitamin B12   PT/INR - ( 13 Dec 2018 08:01 )   PT: 12.2 sec;   INR: 1.12 ratio               RADIOLOGY    ANALYSIS AND PLAN:  A 73-year-old with episode of change in the mental status, history of subdural hematoma.  1.	For change in the mental status, suspect most likely secondary to progressive dementia along with underlying metabolic etiology, hypernatremia, and decreased oral intake.  Examination was limited but I do not see any clear signs to suggest new cerebrovascular accident had ensued.  2.	I would recommend IV fluid hydration.  3.	Monitor oral intake.  4.	Monitor sodium levels.  5.	For history of dementia, the patient did try medications in the past but appeared to have side effects secondary to the patient's age and it appeared to be advanced, did not feel that medications would be of any benefit.  6.	For history of subdural hematoma, as per my conversation with spouse, his last CT imaging showed had resolution of it.  7.	Spoke with spouse Elaine at bedside, her telephone number is [phone]. 12/10/18  She understands while in the hospital setting may become more disoriented.  8.	For agitation, continue the patient on his Depakote and Seroquel.  Haldol was added.  Make adjustments to medications as needed. Patient on Depakote as a mood stabilizer not for seizures ok to bojorquez to alt mood stabilizer psych noted plan to taper seroquel-- wll change depakote to liquid form   9.	psych follow up as needed  10.	poor po intake   11.	testicular sonogram   12.	antibiotics as needed     Thank you for the courtesy of consultation.    Physical therapy evaluation as tolerated  OOB to chair/ambulation with assistance only if possible.    Greater than 40 minutes spent in direct patient care reviewing  the notes, lab data/ imaging , discussion with multidisciplinary team.

## 2018-12-13 NOTE — CONSULT NOTE ADULT - SUBJECTIVE AND OBJECTIVE BOX
CHIEF COMPLAINT:  73y Male with chief complaint of uti,  found to have swollen left testicle   has dementia          HISTORY OF PRESENT ILLNESS:    72 yo AAM with hx of ALZHEIMER'S DISEASE ,SUBDURAL HEMATOMA, HTN, CAD ,GERD , HARISH ,VIT B12 DEFICIENCY ,GOUT ,CONSTIPATION .brought to ED from [facility] due to poor po intake and lethargy ,worsening for 3 days . Patient refused iv line placement and labs due to severe agitation and dementia ,in [facility] he was  combative and aggressive with medical staff and psychiatric evaluation is  requested Admit for iv hydration, monitor renal profile and urine output ,nutritionist consult ,prealbumin level ,serial bmp, nutritional supplements, multivitamins ,palliative care evaluation  regarding MOLST completion and artificial nutrition discussion .      *************************************************************************************************  PAST MEDICAL & SURGICAL HISTORY:  Alzheimer disease  Urinary retention  BPH (benign prostatic hyperplasia)  Constipation  HTN (hypertension)  SDH (subdural hematoma)  HARISH (obstructive sleep apnea)  GERD (gastroesophageal reflux disease)  Agitation  Dementia  ASHD (arteriosclerotic heart disease)  Gout      MEDICATIONS  (STANDING):  cyanocobalamin 500 MICROGram(s) Oral daily  dextrose 5%. 1000 milliLiter(s) (50 mL/Hr) IV Continuous <Continuous>  famotidine    Tablet 20 milliGRAM(s) Oral daily  heparin  Injectable 5000 Unit(s) SubCutaneous every 12 hours  piperacillin/tazobactam IVPB. 3.375 Gram(s) IV Intermittent every 8 hours  polyethylene glycol 3350 17 Gram(s) Oral two times a day  QUEtiapine 100 milliGRAM(s) Oral at bedtime  risperiDONE  Disintegrating Tablet 1 milliGRAM(s) Oral daily  risperiDONE  Disintegrating Tablet 1 milliGRAM(s) Oral <User Schedule>  senna 2 Tablet(s) Oral at bedtime  tamsulosin 0.8 milliGRAM(s) Oral at bedtime  valproic  acid Syrup 500 milliGRAM(s) Oral <User Schedule>  valproic  acid Syrup 1000 milliGRAM(s) Oral at bedtime    MEDICATIONS  (PRN):  acetaminophen   Tablet .. 650 milliGRAM(s) Oral every 6 hours PRN Temp greater or equal to 38C (100.4F), Mild Pain (1 - 3)  haloperidol    Injectable 3 milliGRAM(s) IntraMuscular every 4 hours PRN severe agitation like pulling lines  traMADol 25 milliGRAM(s) Oral every 6 hours PRN Moderate Pain (4 - 6)      ALLERGIES:  No Known Allergies      SOCIAL HISTORY:          ETOH:                                  Smoking:                                   Drugs:                                         Occupation:    FAMILY HISTORY:  No pertinent family history in first degree relatives      CONSTITUTIONAL: No weakness, fevers or chills    EYES/ENT: No visual changes;  No vertigo or throat pain     NECK: No pain or stiffness    RESPIRATORY: No cough, wheezing, hemoptysis; No shortness of breath    CARDIOVASCULAR: No chest pain or palpitations    GASTROINTESTINAL: No abdominal or epigastric pain. No nausea, vomiting, or hematemesis; No diarrhea or constipation. No melena or hematochezia.    GENITOURINARY: Hx BPH     NEUROLOGICAL: No numbness or weakness    SKIN: No itching, burning, rashes, or lesions     All other review of systems is negative unless indicated above.    ****************************************************************************************************  PHYSICAL EXAM:    Vital Signs Last 24 Hrs  T(C): 36.9 (13 Dec 2018 05:20), Max: 36.9 (12 Dec 2018 21:10)  T(F): 98.5 (13 Dec 2018 05:20), Max: 98.5 (12 Dec 2018 21:10)  HR: 86 (13 Dec 2018 08:13) (66 - 94)  BP: 164/73 (13 Dec 2018 05:20) (98/58 - 164/73)  BP(mean): --  RR: 19 (13 Dec 2018 05:20) (18 - 20)  SpO2: 97% (13 Dec 2018 08:13) (90% - 97%)    GENERAL: uncooperative    voids into bed     PENIS: edema     TESTES: left rock hard  rt normal     PROSTATE/ RECTAL:    ABDOMEN: soft  no distention but obese     BACK:    LOWER EXTREMITIES:    NEUROLOGICAL:      *******************************************************************************************************  LABS:                        12.2    )-----------( 272      ( 12 Dec 2018 06:43 )             38.1     12-    141  |  104  |  12  ----------------------------<  134<H>  4.3   |  33<H>  |  1.12    Ca    8.7      13 Dec 2018 08:01    TPro  6.6  /  Alb  2.0<L>  /  TBili  0.5  /  DBili  x   /  AST  23  /  ALT  38  /  AlkPhos  53  12-13    PT/INR - ( 13 Dec 2018 08:01 )   PT: 12.2 sec;   INR: 1.12 ratio           Urinalysis Basic - ( 11 Dec 2018 16:55 )    Color: Yellow / Appearance: Slightly Turbid / S.010 / pH: x  Gluc: x / Ketone: Negative  / Bili: Negative / Urobili: 8 mg/dL   Blood: x / Protein: 30 mg/dL / Nitrite: Positive   Leuk Esterase: Moderate / RBC: 25-50 /HPF / WBC >50   Sq Epi: x / Non Sq Epi: Few / Bacteria: Many      Urine Culture:  @ 21:17  Urine Culure Results   >100,000 CFU/ml Escherichia coli  Organism --      RADIOLOGY & ADDITIONAL STUDIES:      *********************************************************************************************************  IMPRESSION: e coli UTI   infection testicle likely  severe dementia     PLAN: testicle sonogram  bladder sonogram  r/o retention   Is on zosyn and flomax
Infectious Diseases Consult by Konstantin Cotter MD    Reason for Consult :    HPI:  72 yo AAM with hx of ALZHEIMER'S DISEASE ,SUBDURAL HEMATOMA, HTN, CAD ,GERD , HARISH ,VIT B12 DEFICIENCY ,GOUT ,CONSTIPATION .brought to ED from [facility] due to poor po intake and lethargy ,worsening for 3 days . Patient refused iv line placement and labs due to severe agitation and dementia ,in [facility] he was  combative and aggressive with medical staff and psychiatric evaluation is  requested Admit for iv hydration, monitor renal profile and urine output ,nutritionist consult ,prealbumin level ,serial bmp, nutritional supplements, multivitamins ,palliative care evaluation  regarding MOLST completion and artificial nutrition discussion .     He was seen by psychiatry and was placed on behaviour modifying meds with Risperdal Seroquel and prn haldol. he has been very lethargic for last few days, been refusing meals and note to have leukocytosis. He was noted to have swollen left testicle, seen by Urology , His UA was positive and urine cs shows e coli . He was noted ot have leukocytosis so all sepsis work up started . He has been afebrile     Awaiting to have goal of care conversation for comfort care or PEG    Past Medical & Surgical Hx:  PAST MEDICAL & SURGICAL HISTORY:  Alzheimer disease  Urinary retention  BPH (benign prostatic hyperplasia)  Constipation  HTN (hypertension)  SDH (subdural hematoma)  HARISH (obstructive sleep apnea)  GERD (gastroesophageal reflux disease)  Agitation  Dementia  ASHD (arteriosclerotic heart disease)  Gout      Social History-- Resident of [facility]  EtOH: denies   Tobacco: denies   Drug Use: denies       FAMILY HISTORY:  No pertinent family history in first degree relatives      Allergies    No Known Allergies    Intolerances        Home/ Out patient  Medications :  Home Medications:  cyanocobalamin 500 mcg oral tablet: 1 tab(s) orally once a day (06 Dec 2018 14:45)  divalproex sodium 500 mg oral tablet, extended release: 2 tab(s) orally once a day (at bedtime) (06 Dec 2018 14:45)  divalproex sodium 500 mg oral tablet, extended release: 1 tab(s) orally 2 times a day 9 am and 1pm (06 Dec 2018 14:45)  MiraLax oral powder for reconstitution: orally 3 times a day (06 Dec 2018 14:45)  QUEtiapine 100 mg oral tablet: orally once a day (at bedtime) (06 Dec 2018 14:45)  Senna 8.6 mg oral tablet: 1 tab(s) orally once a day (at bedtime) (06 Dec 2018 14:45)  tamsulosin 0.4 mg oral capsule: 1 cap(s) orally once a day (06 Dec 2018 14:45)  traMADol 50 mg oral tablet: 0.5 tab(s) orally every 6 hours (06 Dec 2018 14:45)  Tylenol 325 mg oral tablet: 2 tab(s) orally every 6 hours (06 Dec 2018 14:45)      Current Inpatient Medications :    ANTIBIOTICS:   piperacillin/tazobactam IVPB. 3.375 Gram(s) IV Intermittent every 8 hours      OTHER RELEVANT MEDICATIONS :  acetaminophen   Tablet .. 650 milliGRAM(s) Oral every 6 hours PRN  cyanocobalamin 500 MICROGram(s) Oral daily  dextrose 5%. 1000 milliLiter(s) IV Continuous <Continuous>  famotidine    Tablet 20 milliGRAM(s) Oral daily  haloperidol    Injectable 3 milliGRAM(s) IntraMuscular every 4 hours PRN  heparin  Injectable 5000 Unit(s) SubCutaneous every 12 hours  polyethylene glycol 3350 17 Gram(s) Oral two times a day  QUEtiapine 100 milliGRAM(s) Oral at bedtime  risperiDONE  Disintegrating Tablet 1 milliGRAM(s) Oral daily  risperiDONE  Disintegrating Tablet 1 milliGRAM(s) Oral <User Schedule>  senna 2 Tablet(s) Oral at bedtime  tamsulosin 0.8 milliGRAM(s) Oral at bedtime  traMADol 25 milliGRAM(s) Oral every 6 hours PRN  valproic  acid Syrup 500 milliGRAM(s) Oral <User Schedule>  valproic  acid Syrup 1000 milliGRAM(s) Oral at bedtime      ROS:  Unable to obtain due to : obtundation           I&O's Detail    12 Dec 2018 07:01  -  13 Dec 2018 07:00  --------------------------------------------------------  IN:    dextrose 5%.: 500 mL    IV PiggyBack: 100 mL    Oral Fluid: 220 mL  Total IN: 820 mL    OUT:  Total OUT: 0 mL    Total NET: 820 mL          Physical Exam:  Vital Signs Last 24 Hrs  T(C): 36.9 (13 Dec 2018 09:15), Max: 36.9 (12 Dec 2018 21:10)  T(F): 98.4 (13 Dec 2018 09:15), Max: 98.5 (12 Dec 2018 21:10)  HR: 80 (13 Dec 2018 09:15) (66 - 94)  BP: 146/84 (13 Dec 2018 09:15) (98/58 - 164/73)  BP(mean): --  RR: 20 (13 Dec 2018 09:15) (19 - 20)  SpO2: 96% (13 Dec 2018 09:15) (95% - 97%)      General: Obtunded well developed , well nourished, in no acute distress  Eyes: sclera anicteric, pupils equal and reactive to light  ENMT: buccal mucosa moist, pharynx not injected  Neck: supple, trachea midline  Lungs: clear, no wheeze/rhonchi  Cardiovascular: regular rate and rhythm, S1 S2  Abdomen: soft, nontender, no organomegaly present, bowel sounds normal  Neurological:  obtunded, non verbal, sedated , Cranial Nerves II-XII grossly intact  Skin: no increased ecchymosis/petechiae/purpura  Lymph Nodes: no palpable cervical/supraclavicular lymph nodes enlargements  Extremities: no cyanosis/clubbing/edema    Labs:                   12.1   12.43  )----------(  299       ( 13 Dec 2018 08:01 )               37.4        Complete Blood Count in AM (12.11.18 @ 08:31)    Nucleated RBC: 0 /100 WBCs    WBC Count: 16.97 K/uL    RBC Count: 4.42 M/uL    Hemoglobin: 13.0 g/dL    Hematocrit: 40.1 %    Mean Cell Volume: 90.7 fl    Mean Cell Hemoglobin: 29.4 pg    Mean Cell Hemoglobin Conc: 32.4 gm/dL    Red Cell Distrib Width: 13.4 %    Platelet Count - Automated: 216 K/uL      141    |  104    |  12     ----------------------------<  134        ( 13 Dec 2018 08:01 )  4.3     |  33     |  1.12     Ca    8.7        ( 13 Dec 2018 08:01 )    TPro  6.6    /  Alb  2.0    /  TBili  0.5    /  DBili  x      /  AST  23     /  ALT  38     /  AlkPhos  53     ( 13 Dec 2018 08:01 )    LIVER FUNCTIONS - ( 13 Dec 2018 08:01 )  Alb: 2.0 g/dL / Pro: 6.6 g/dL / ALK PHOS: 53 U/L / ALT: 38 U/L DA / AST: 23 U/L / GGT: x           PT/INR -  12.2 sec / 1.12 ratio   ( 13 Dec 2018 08:01 )     Urine Microscopic-Add On (NC) (12.11.18 @ 16:55)    Red Blood Cell - Urine: 25-50 /HPF    White Blood Cell - Urine: >50    Bacteria: Many    Epithelial Cells: Few      RECENT CULTURES:    Culture - Urine (collected 12-11-18 @ 21:17)  Source: .Urine Clean Catch (Midstream)  Preliminary Report (12-12-18 @ 16:03):    >100,000 CFU/ml Escherichia coli      RADIOLOGY & ADDITIONAL STUDIES:  US Urinary Bladder (12.13.18 @ 10:14) >  Technical note indicates patient unable to cooperate.    Urinary bladder contained 69 cc of urine. No stones evident. Small debris   level visible within the urinary bladder. Postvoid image was not obtained.    IMPRESSION:    See above report.  CT Chest No Cont (12.11.18 @ 14:11) >  Noncontrast chest CT.    There are streaky fibrotic/atelectatic changes at the dependent lung   bases. There is additionalsmall patchy opacity in the left lower lobe   posteriorly which may represent subsegmental atelectasis and/or   pneumonia. Correlate clinically for active infection. No pleural   collections.  Central airways patent. No mediastinal adenopathy. Hilar evaluation   limited without IV contrast. Nonaneurysmal thoracic aorta. Normal heart   size with coronary artery calcification. Trace pericardial effusion   versus pericardial thickening.  Punctate nonobstructive left renal calculus.  Degenerative change dorsal spine.    Impression:  Left basilar subsegmental atelectasis and/or pneumonia.  Additional findings as discussed.      Assessment :     72 yo AAM with hx of ALZHEIMER'S DISEASE ,SUBDURAL HEMATOMA, HTN, CAD ,GERD , HARISH ,VIT B12 DEFICIENCY ,GOUT ,CONSTIPATION .brought to ED from [facility] admitted with AMS and agitation ,seen by psych and placed on Risperdal and Seroquel , noted ot have leukocytosis on 12/11/18 when ready for dc noted to have  infection with UTI and left testicular swelling likely epididymo orchitis    He appears oversedated and at high Risk for aspiration     Family is considering Comfort measures vs placement of PEG      Leukocytosis is likley from  in infection     Plan :   - continue with IV Zosyn   - follow testicular sonogram  - palliative care follow up , need to have meeting with family to decide goals of care   - trend cbc    Continue with present regime .  Appropriate use of antibiotics and adverse effects reviewed.      I have discussed the above plan of care with patient 's family in detail. They expressed understanding of the treatment plan . Risks, benefits and alternatives discussed in detail. I have asked if they have any questions or concerns and appropriately addressed them to the best of my ability .      > 55 minutes spent in direct patient care reviewing  the notes, lab data/ imaging , discussion with multidisciplinary team. All questions were addressed and answered to the best of my capacity .    Thank you for allowing me to participate in the care of your patient .      Konstantin Cotter MD  [phone]
pl see assessment
Patient is a 73y Male whom presented to the hospital with ckd and haleigh . hypernatremia     PAST MEDICAL & SURGICAL HISTORY:  Alzheimer disease  Urinary retention  BPH (benign prostatic hyperplasia)  Constipation  HTN (hypertension)  SDH (subdural hematoma)  HARISH (obstructive sleep apnea)  GERD (gastroesophageal reflux disease)  Agitation  Dementia  ASHD (arteriosclerotic heart disease)  Gout      MEDICATIONS  (STANDING):  cyanocobalamin 500 MICROGram(s) Oral daily  dextrose 5%. 1000 milliLiter(s) (100 mL/Hr) IV Continuous <Continuous>  diVALproex  milliGRAM(s) Oral <User Schedule>  diVALproex DR 1000 milliGRAM(s) Oral at bedtime  famotidine    Tablet 20 milliGRAM(s) Oral daily  heparin  Injectable 5000 Unit(s) SubCutaneous every 12 hours  polyethylene glycol 3350 17 Gram(s) Oral two times a day  QUEtiapine 100 milliGRAM(s) Oral at bedtime  senna 2 Tablet(s) Oral at bedtime  tamsulosin 0.8 milliGRAM(s) Oral at bedtime      Allergies    No Known Allergies    Intolerances        SOCIAL HISTORY:  Denies ETOh,Smoking,     FAMILY HISTORY:  No pertinent family history in first degree relatives      REVIEW OF SYSTEMS:    unable to obtaind a good ros     VITAL:  T(C): --  T(F): --  HR: 92 (12-06-18 @ 12:55)  BP: 146/96 (12-06-18 @ 12:55)  BP(mean): --  RR: 16 (12-06-18 @ 12:55)  SpO2: 99% (12-06-18 @ 12:55)  Wt(kg): --    I and O's:    Height (cm): 177.8 (12-06 @ 12:55)  Weight (kg): 113.4 (12-06 @ 12:55)  BMI (kg/m2): 35.9 (12-06 @ 12:55)  BSA (m2): 2.29 (12-06 @ 12:55)    PHYSICAL EXAM:    Constitutional: NAD  HEENT: conjunctive   clear   Neck:  No JVD  Respiratory: decrease bs b/l   Cardiovascular: S1 and S2  Gastrointestinal: BS+, soft, NT/ND  Extremities: No peripheral edema  Neurological:  no focal deficits  Psychiatric: unable to obtained   Skin: dry   Access: Not applicable    LABS:                        13.3   12.85 )-----------( 381      ( 06 Dec 2018 13:42 )             43.2     12-06    152<H>  |  110<H>  |  30<H>  ----------------------------<  126<H>  4.2   |  35<H>  |  1.38<H>    Ca    9.3      06 Dec 2018 13:42    TPro  7.7  /  Alb  2.4<L>  /  TBili  0.7  /  DBili  x   /  AST  25  /  ALT  39  /  AlkPhos  55  12-06      Urine Studies:          RADIOLOGY & ADDITIONAL STUDIES:

## 2018-12-13 NOTE — PROGRESS NOTE ADULT - SUBJECTIVE AND OBJECTIVE BOX
Patient was examined Chart reviewed Continue management as per my previous note recommendations Detailed note and recommendations will be entered below after today's data is processed Patient was examined Chart reviewed Continue management as per my previous note recommendations Detailed note and recommendations will be entered below after today's data is processed       WILLIAM CAGE [facility] S [phone] [id number]   CONTACT Omari Elaine ANAYA [phone] self [phone]   ALLERGY nka   REASON FOR VISIT     Subsequent pulmonary followup  Initial evaluation/Pulmonary Critical Care consultation requested on 12/6/2018  by Dr Marcos   from Dr Brothers   Patient examined chart reviewed  HOSPITAL ADMISSION [facility] S 12/6/2018    PATIENT CAME  FROM (if information available)      IVF  D5 100 (12/6) ch D5 75 (12/6) ch D5 50 (12/9)    VITALS/LABS      12/13/2018 afeb 92 122/78 19 98%   12/13/2018 W 12.4 Hb 12.1 Plt 299  Na 141 K 4.3 CO2 33 Cr 1.1   12/13/2018 prct .42     REVIEW OF SYMPTOMS    Able to give ROS  NO     PHYSICAL EXAM    HEENT Unremarkable PERRLA atraumatic   RESP Fair air entry EXP prolonged    Harsh breath sound Resp distres mild   CARDIAC S1 S2 No S3     NO JVD    ABDOMEN SOFT BS PRESENT NOT DISTENDED No hepatosplenomegaly PEDAL EDEMA present No calf tenderness  NO rash   GENERAL Not TOXIC looking    GLOBAL ISSUE/BEST PRACTICE:      PROBLEM: HOB elevation:   y            PROBLEM: Stress ulcer proph:    pepcid 20 (12/6)                       PROBLEM: VTE prophylaxis:      hsc (12/6)   PROBLEM: Glycemic control:    na  PROBLEM: Nutrition:    soft DASH (12/6)   PROBLEM: Advanced directive: na     PROBLEM: Allergies:  na    PATIENT DESCRIPTION PATIENT WILLIAM CAGE  12/6/2018  ADMISSION [facility] S  DR GAMALIEL STEINBERG            72 y/o M pt w/ PMHx Alzheimer's disease, subdural hematoma presents to the ED BIBA from [facility] for evaluation of agitation. Pt sent to ED by PMD for eval of agitation. admitted 12/6/2018Pt unable to provide hx due to pt's hx of Alzheimer's.   Pt was noted to be hypernatremic Pulm consulted 12/6/2018 because pt has HO HARISH     PROBLEM SPECIFIC PATIENT DATA  PATIENT WILLIAM CAGE  12/6/2018  ADMISSION [facility] S  DR GAMALIEL STEINBERG       DEHYDRATION HYPERNATREMIA  D5 100 (12/6)  ch D5 75 (12/6)   12/6-12/7-12/8-12/9-12/11 Na [phone]  HARISH   CPAP 10 (12/5) ordered empirically   12/7/2018 DW pt spouse last night She agreed that pt likely will not keep cpap on   NEUROPSYCHOTROPIC   Quetiapine 100 (12/6)   depakote 500.2 (12/6)   depakote 1000 (12/6)   Haldol 1.4p (12/6)   BPH  Tamsulosin .4 (12/6)   INFECTION   12/13/2018 us testicles bl epididymal enlargement with hyperemia   12/11 CXR   12/11 CT ch L basal ssa or pneumonia   12/11 u 100K E coli Nitr pos Mod L estrase Large bld   12/11 ua Over 50 w          ASSESSMENT/RECOMMENDATIONS PATIENT WILLIAM CAGE  12/6/2018  ADMISSION [facility] S  DR GAMALIEL MARAVILLAUTER       DEHYDRATION HYPERNATREMIA  Dehydration resolving Hypernatremia improving   HARISH   Patient not using CPAP but has remained clinically stable   LEUKOCYTOSIS   12/13/2018 Seen by ID May also have epididymoorchitis Zosyn continued   12/12/2018 dw Dr Marcos Start zosyn to cover uti and asp pneum  NEUROPSYCHOTROPIC   On meds agitation improved   BPH  Tamsulosin .4 (12/6)          TIME SPENT Over 25 minutes aggregate care time spent on encounter; activities included   direct patient care, counseling and/or coordinating care reviewing notes, lab data/ imaging , discussion with multidisciplinary team/ patient  /family. Risks, benefits, alternatives  discussed in detail.

## 2018-12-13 NOTE — PROGRESS NOTE ADULT - NSHPATTENDINGPLANDISCUSS_GEN_ALL_CORE
med staff , Dr King ,spoke to the wife Elaine in length on a phone [phone] ,case d/w social service on 1 [place]

## 2018-12-13 NOTE — PROGRESS NOTE ADULT - SUBJECTIVE AND OBJECTIVE BOX
PROGRESS NOTE-  Patient is a 73y old  Male who presents with a chief complaint of poor po intake and lethargy (13 Dec 2018 08:33)  Chart and available morning labs /imaging are reviewed electronically , urgent issues addressed . More information  is being added upon completion of rounds , when more information is collected and management discussed with consultants , medical staff and social service/case management on the floor .  OVERNIGHT-  Patient is resting in a bed comfortably .Confused ,poor mentation ,periods of agitation  .No distress noted .  HPI:  74 yo AAM with hx of ALZHEIMER'S DISEASE ,SUBDURAL HEMATOMA, HTN, CAD ,GERD , HARISH ,VIT B12 DEFICIENCY ,GOUT ,CONSTIPATION .brought to ED from [facility] due to poor po intake and lethargy ,worsening for 3 days . Patient refused iv line placement and labs due to severe agitation and dementia ,in [facility] he was  combative and aggressive with medical staff and psychiatric evaluation is  requested Admit for iv hydration, monitor renal profile and urine output ,nutritionist consult ,prealbumin level ,serial bmp, nutritional supplements, multivitamins ,palliative care evaluation  regarding MOLST completion and artificial nutrition discussion . (06 Dec 2018 15:20)  PAST MEDICAL & SURGICAL HISTORY:  Alzheimer disease  Urinary retention  BPH (benign prostatic hyperplasia)  Constipation  HTN (hypertension)  SDH (subdural hematoma)  HARISH (obstructive sleep apnea)  GERD (gastroesophageal reflux disease)  Agitation  Dementia  ASHD (arteriosclerotic heart disease)  Gout  MEDICATIONS  (STANDING):  cyanocobalamin 500 MICROGram(s) Oral daily  dextrose 5%. 1000 milliLiter(s) (50 mL/Hr) IV Continuous <Continuous>  famotidine    Tablet 20 milliGRAM(s) Oral daily  heparin  Injectable 5000 Unit(s) SubCutaneous every 12 hours  piperacillin/tazobactam IVPB. 3.375 Gram(s) IV Intermittent every 8 hours  polyethylene glycol 3350 17 Gram(s) Oral two times a day  QUEtiapine 100 milliGRAM(s) Oral at bedtime  risperiDONE  Disintegrating Tablet 1 milliGRAM(s) Oral daily  risperiDONE  Disintegrating Tablet 1 milliGRAM(s) Oral <User Schedule>  senna 2 Tablet(s) Oral at bedtime  tamsulosin 0.8 milliGRAM(s) Oral at bedtime  valproic  acid Syrup 500 milliGRAM(s) Oral <User Schedule>  valproic  acid Syrup 1000 milliGRAM(s) Oral at bedtime  MEDICATIONS  (PRN):  acetaminophen   Tablet .. 650 milliGRAM(s) Oral every 6 hours PRN Temp greater or equal to 38C (100.4F), Mild Pain (1 - 3)  haloperidol    Injectable 3 milliGRAM(s) IntraMuscular every 4 hours PRN severe agitation like pulling lines  traMADol 25 milliGRAM(s) Oral every 6 hours PRN Moderate Pain (4 - 6)  OBJECTIVE  T(C): 36.9 (18 @ 05:20), Max: 36.9 (18 @ 21:10)  HR: 86 (18 @ 08:13) (66 - 94)  BP: 164/73 (18 @ 05:20) (98/58 - 164/73)  RR: 19 (18 @ 05:20) (18 - 20)  SpO2: 97% (18 @ 08:13) (90% - 97%)  Wt(kg): --  I&O's Summary  12 Dec 2018 07:01  -  13 Dec 2018 07:00  --------------------------------------------------------  IN: 820 mL / OUT: 0 mL / NET: 820 mL  REVIEW OF SYSTEMS:  ROS is unobtainable due to lethargy and confusion   PHYSICAL EXAM:  Appearance: NAD. VS past 24 hrs -as above   HEENT:   Moist oral mucosa. Conjunctiva clear b/l.   Neck : supple  Respiratory: Lungs -diminished bs at bases  Gastrointestinal:  Soft, nontender. No rebound. No rigidity. BS present	  Cardiovascular: RRR ,S1S2 present  Neurologic: Non-focal. Moving all extremities.  Extremities: No edema. No erythema. No calf tenderness.  Testicular swelling 	  wounds ,skin lesions-See skin assesment flow sheet   LABS:                      12.1   12.43 )-----------( 299      ( 13 Dec 2018 08:01 )             37.4     141  |  104  |  12  ----------------------------<  134<H>  4.3   |  33<H>  |  1.12  Ca    8.7      13 Dec 2018 08:01  TPro  6.6  /  Alb  2.0<L>  /  TBili  0.5  /  DBili  x   /  AST  23  /  ALT  38  /  AlkPhos  53  12-13  CAPILLARY BLOOD GLUCOSE  PT/INR - ( 13 Dec 2018 08:01 )   PT: 12.2 sec;   INR: 1.12 ratio    Urinalysis Basic - ( 11 Dec 2018 16:55 )  Color: Yellow / Appearance: Slightly Turbid / S.010 / pH: x  Gluc: x / Ketone: Negative  / Bili: Negative / Urobili: 8 mg/dL   Blood: x / Protein: 30 mg/dL / Nitrite: Positive   Leuk Esterase: Moderate / RBC: 25-50 /HPF / WBC >50   Sq Epi: x / Non Sq Epi: Few / Bacteria: Many  Culture - Urine (collected 11 Dec 2018 21:17)  Source: .Urine Clean Catch (Midstream)  Preliminary Report (12 Dec 2018 16:03):    >100,000 CFU/ml Escherichia coli  RADIOLOGY & ADDITIONAL TESTS:< from: CT Chest No Cont (18 @ 14:11) >  EXAM:  CT CHEST                        PROCEDURE DATE:  2018    INTERPRETATION:  History: Pneumonia.  Noncontrast chest CT.  There are streaky fibrotic/atelectatic changes at the dependent lung   bases. There is additionalsmall patchy opacity in the left lower lobe   posteriorly which may represent subsegmental atelectasis and/or   pneumonia. Correlate clinically for active infection. No pleural   collections.  Central airways patent. No mediastinal adenopathy. Hilar evaluation   limited without IV contrast. Nonaneurysmal thoracic aorta. Normal heart   size with coronary artery calcification. Trace pericardial effusion   versus pericardial thickening.  Punctate nonobstructive left renal calculus.  Degenerative change dorsal spine.  Impression:  Left basilar subsegmental atelectasis and/or pneumonia.  Additional findings as discussed.  < end of copied text >   reviewed elctronically  ASSESSMENT/PLAN:

## 2018-12-13 NOTE — CHART NOTE - NSCHARTNOTEFT_GEN_A_CORE
Upon Nutritional Assessment by the Registered Dietitian your patient was determined to meet criteria / has evidence of the following diagnosis/diagnoses:          [ ]  Mild Protein Calorie Malnutrition        [x ]  Moderate Protein Calorie Malnutrition in context of chronic illness        [ ] Severe Protein Calorie Malnutrition        [ ] Unspecified Protein Calorie Malnutrition        [ ] Underweight / BMI <19        [ ] Morbid Obesity / BMI > 40      Findings as based on:  •  Comprehensive nutrition assessment and consultation  •  Calorie counts (nutrient intake analysis)  •  Food acceptance and intake status from observations by staff  •  Follow up  •  Patient education  •  Intervention secondary to interdisciplinary rounds  •   concerns    73 year old male pmhx significant for Alzheimers disease adm from [facility] c poor po intake and lethargy.  Pt also admitted c hypernatremia secondary to dehydration, which is now resolved; continues on D5NaCl@50ml/hr.  Po intake overall continues to remain inadequate; per RN, pt has refused meals today.  Pt was seen for SLP evaluation 12/12 and pt p/w oropharyngeal dysphagia- recommended Dysphagia #1 pureed diet c nectar thick consistency fluids, which pt is now on.  Additional nutrition supplements were added to diet order but to no avail.  It appears inadequate oral intake is related to progression of Alzheimers disease.  Per chart, artificial nutrition has been discussed c wife- she is currently refusing NGT or PEG as pt is likely to pull out during periods of agitation. RD to continue to follow closely and will make tube feeding recommendations if wife were to be agreeable to AN in the future.  Per transfer papers, pt's weight was 257# (no date provided); on admission (12/6/18), pt's weight was documented at 225# and as of 12/12/18, pt's weight was 219.8# (indicating 2.3% x 7 days, which is significant).  Pt has also had <75% energy intake compared to estimated energy needs for >1 month.  Prealbumin is also noted 9 mg/dl suggesting malnutrition.  Based on significant wt loss and inadequate intake pt meets clinical characteristics of moderate malnutrition in context of chronic illness.      Treatment:  Dysphagia #1 pureed diet c nectar thick consistency fluids, nutritional supplements- ensure enlive, magic cup fortified ice cream  The following diet has been recommended: c/w POC; RD to follow closely and will make continued recommendations following hospital course      PROVIDER Section:     By signing this assessment you are acknowledging and agree with the diagnosis/diagnoses assigned by the Registered Dietitian    Comments:

## 2018-12-13 NOTE — CONSULT NOTE ADULT - REASON FOR ADMISSION
poor po intake and lethargy

## 2018-12-14 ENCOUNTER — TRANSCRIPTION ENCOUNTER (OUTPATIENT)
Age: 73
End: 2018-12-14

## 2018-12-14 VITALS
HEART RATE: 69 BPM | DIASTOLIC BLOOD PRESSURE: 63 MMHG | SYSTOLIC BLOOD PRESSURE: 98 MMHG | RESPIRATION RATE: 18 BRPM | TEMPERATURE: 98 F

## 2018-12-14 DIAGNOSIS — N39.0 URINARY TRACT INFECTION, SITE NOT SPECIFIED: ICD-10-CM

## 2018-12-14 DIAGNOSIS — N45.1 EPIDIDYMITIS: ICD-10-CM

## 2018-12-14 LAB
ANION GAP SERPL CALC-SCNC: 5 MMOL/L — SIGNIFICANT CHANGE UP (ref 5–17)
BASOPHILS # BLD AUTO: 0.05 K/UL — SIGNIFICANT CHANGE UP (ref 0–0.2)
BASOPHILS NFR BLD AUTO: 0.4 % — SIGNIFICANT CHANGE UP (ref 0–2)
BUN SERPL-MCNC: 8 MG/DL — SIGNIFICANT CHANGE UP (ref 7–23)
CALCIUM SERPL-MCNC: 8.9 MG/DL — SIGNIFICANT CHANGE UP (ref 8.4–10.5)
CHLORIDE SERPL-SCNC: 104 MMOL/L — SIGNIFICANT CHANGE UP (ref 96–108)
CO2 SERPL-SCNC: 34 MMOL/L — HIGH (ref 22–31)
CREAT SERPL-MCNC: 1.11 MG/DL — SIGNIFICANT CHANGE UP (ref 0.5–1.3)
CRP SERPL-MCNC: 11.06 MG/DL — HIGH (ref 0–0.4)
EOSINOPHIL # BLD AUTO: 0 K/UL — SIGNIFICANT CHANGE UP (ref 0–0.5)
EOSINOPHIL NFR BLD AUTO: 0 % — SIGNIFICANT CHANGE UP (ref 0–6)
GLUCOSE SERPL-MCNC: 156 MG/DL — HIGH (ref 70–99)
HCT VFR BLD CALC: 37.2 % — LOW (ref 39–50)
HGB BLD-MCNC: 11.9 G/DL — LOW (ref 13–17)
IMM GRANULOCYTES NFR BLD AUTO: 2.2 % — HIGH (ref 0–1.5)
LYMPHOCYTES # BLD AUTO: 0.84 K/UL — LOW (ref 1–3.3)
LYMPHOCYTES # BLD AUTO: 7 % — LOW (ref 13–44)
MCHC RBC-ENTMCNC: 29.6 PG — SIGNIFICANT CHANGE UP (ref 27–34)
MCHC RBC-ENTMCNC: 32 GM/DL — SIGNIFICANT CHANGE UP (ref 32–36)
MCV RBC AUTO: 92.5 FL — SIGNIFICANT CHANGE UP (ref 80–100)
MONOCYTES # BLD AUTO: 1.28 K/UL — HIGH (ref 0–0.9)
MONOCYTES NFR BLD AUTO: 10.7 % — SIGNIFICANT CHANGE UP (ref 2–14)
NEUTROPHILS # BLD AUTO: 9.54 K/UL — HIGH (ref 1.8–7.4)
NEUTROPHILS NFR BLD AUTO: 79.7 % — HIGH (ref 43–77)
NRBC # BLD: 0 /100 WBCS — SIGNIFICANT CHANGE UP (ref 0–0)
PLATELET # BLD AUTO: 286 K/UL — SIGNIFICANT CHANGE UP (ref 150–400)
POTASSIUM SERPL-MCNC: 3.8 MMOL/L — SIGNIFICANT CHANGE UP (ref 3.5–5.3)
POTASSIUM SERPL-SCNC: 3.8 MMOL/L — SIGNIFICANT CHANGE UP (ref 3.5–5.3)
PROCALCITONIN SERPL-MCNC: 0.38 NG/ML — HIGH (ref 0.02–0.1)
RBC # BLD: 4.02 M/UL — LOW (ref 4.2–5.8)
RBC # FLD: 13.6 % — SIGNIFICANT CHANGE UP (ref 10.3–14.5)
SODIUM SERPL-SCNC: 143 MMOL/L — SIGNIFICANT CHANGE UP (ref 135–145)
WBC # BLD: 11.97 K/UL — HIGH (ref 3.8–10.5)
WBC # FLD AUTO: 11.97 K/UL — HIGH (ref 3.8–10.5)

## 2018-12-14 PROCEDURE — 85027 COMPLETE CBC AUTOMATED: CPT

## 2018-12-14 PROCEDURE — 99285 EMERGENCY DEPT VISIT HI MDM: CPT | Mod: 25

## 2018-12-14 PROCEDURE — 76857 US EXAM PELVIC LIMITED: CPT

## 2018-12-14 PROCEDURE — 76870 US EXAM SCROTUM: CPT

## 2018-12-14 PROCEDURE — 96360 HYDRATION IV INFUSION INIT: CPT

## 2018-12-14 PROCEDURE — 81001 URINALYSIS AUTO W/SCOPE: CPT

## 2018-12-14 PROCEDURE — 71250 CT THORAX DX C-: CPT

## 2018-12-14 PROCEDURE — 36415 COLL VENOUS BLD VENIPUNCTURE: CPT

## 2018-12-14 PROCEDURE — 87186 SC STD MICRODIL/AGAR DIL: CPT

## 2018-12-14 PROCEDURE — 86140 C-REACTIVE PROTEIN: CPT

## 2018-12-14 PROCEDURE — 84145 PROCALCITONIN (PCT): CPT

## 2018-12-14 PROCEDURE — 93005 ELECTROCARDIOGRAM TRACING: CPT

## 2018-12-14 PROCEDURE — 71045 X-RAY EXAM CHEST 1 VIEW: CPT

## 2018-12-14 PROCEDURE — 85730 THROMBOPLASTIN TIME PARTIAL: CPT

## 2018-12-14 PROCEDURE — 87086 URINE CULTURE/COLONY COUNT: CPT

## 2018-12-14 PROCEDURE — 84134 ASSAY OF PREALBUMIN: CPT

## 2018-12-14 PROCEDURE — 87040 BLOOD CULTURE FOR BACTERIA: CPT

## 2018-12-14 PROCEDURE — 80164 ASSAY DIPROPYLACETIC ACD TOT: CPT

## 2018-12-14 PROCEDURE — 85652 RBC SED RATE AUTOMATED: CPT

## 2018-12-14 PROCEDURE — 80053 COMPREHEN METABOLIC PANEL: CPT

## 2018-12-14 PROCEDURE — 80048 BASIC METABOLIC PNL TOTAL CA: CPT

## 2018-12-14 PROCEDURE — 85610 PROTHROMBIN TIME: CPT

## 2018-12-14 PROCEDURE — 94660 CPAP INITIATION&MGMT: CPT

## 2018-12-14 RX ORDER — SENNA PLUS 8.6 MG/1
2 TABLET ORAL
Qty: 0 | Refills: 0 | DISCHARGE
Start: 2018-12-14

## 2018-12-14 RX ORDER — POLYETHYLENE GLYCOL 3350 17 G/17G
17 POWDER, FOR SOLUTION ORAL
Qty: 0 | Refills: 0 | DISCHARGE
Start: 2018-12-14

## 2018-12-14 RX ORDER — VALPROIC ACID (AS SODIUM SALT) 250 MG/5ML
20 SOLUTION, ORAL ORAL
Qty: 0 | Refills: 0 | DISCHARGE
Start: 2018-12-14

## 2018-12-14 RX ORDER — QUETIAPINE FUMARATE 200 MG/1
1 TABLET, FILM COATED ORAL
Qty: 0 | Refills: 0 | DISCHARGE
Start: 2018-12-14

## 2018-12-14 RX ORDER — RISPERIDONE 4 MG/1
1 TABLET ORAL
Qty: 0 | Refills: 0 | DISCHARGE
Start: 2018-12-14

## 2018-12-14 RX ORDER — ACETAMINOPHEN 500 MG
2 TABLET ORAL
Qty: 0 | Refills: 0 | DISCHARGE
Start: 2018-12-14

## 2018-12-14 RX ORDER — TAMSULOSIN HYDROCHLORIDE 0.4 MG/1
2 CAPSULE ORAL
Qty: 0 | Refills: 0 | DISCHARGE
Start: 2018-12-14

## 2018-12-14 RX ORDER — VALPROIC ACID (AS SODIUM SALT) 250 MG/5ML
10 SOLUTION, ORAL ORAL
Qty: 0 | Refills: 0 | DISCHARGE
Start: 2018-12-14

## 2018-12-14 RX ORDER — CEFTRIAXONE 500 MG/1
1 INJECTION, POWDER, FOR SOLUTION INTRAMUSCULAR; INTRAVENOUS EVERY 24 HOURS
Refills: 0 | Status: COMPLETED | OUTPATIENT
Start: 2018-12-14 | End: 2018-12-14

## 2018-12-14 RX ORDER — PREGABALIN 225 MG/1
1 CAPSULE ORAL
Qty: 0 | Refills: 0 | DISCHARGE
Start: 2018-12-14

## 2018-12-14 RX ORDER — CEFPODOXIME PROXETIL 100 MG
1 TABLET ORAL
Qty: 0 | Refills: 0 | DISCHARGE
Start: 2018-12-14

## 2018-12-14 RX ORDER — CEFPODOXIME PROXETIL 100 MG
200 TABLET ORAL EVERY 12 HOURS
Refills: 0 | Status: DISCONTINUED | OUTPATIENT
Start: 2018-12-15 | End: 2018-12-14

## 2018-12-14 RX ADMIN — Medication 500 MILLIGRAM(S): at 08:05

## 2018-12-14 RX ADMIN — RISPERIDONE 1 MILLIGRAM(S): 4 TABLET ORAL at 08:05

## 2018-12-14 RX ADMIN — PREGABALIN 500 MICROGRAM(S): 225 CAPSULE ORAL at 11:01

## 2018-12-14 RX ADMIN — FAMOTIDINE 20 MILLIGRAM(S): 10 INJECTION INTRAVENOUS at 11:02

## 2018-12-14 RX ADMIN — CEFTRIAXONE 100 GRAM(S): 500 INJECTION, POWDER, FOR SOLUTION INTRAMUSCULAR; INTRAVENOUS at 11:01

## 2018-12-14 RX ADMIN — HEPARIN SODIUM 5000 UNIT(S): 5000 INJECTION INTRAVENOUS; SUBCUTANEOUS at 06:04

## 2018-12-14 RX ADMIN — PIPERACILLIN AND TAZOBACTAM 25 GRAM(S): 4; .5 INJECTION, POWDER, LYOPHILIZED, FOR SOLUTION INTRAVENOUS at 05:04

## 2018-12-14 NOTE — DISCHARGE NOTE ADULT - PATIENT PORTAL LINK FT
You can access the [facility], offered by [facility], by registering with the following website: [email or web address]

## 2018-12-14 NOTE — PROGRESS NOTE ADULT - PROBLEM SELECTOR PLAN 1
hypernatremia continue with water is improving , is controlled   ACUTE RENAL FAILURE: due  decrease fluid intake continue with fluid   Serum creatinine is improving     , approximating GFR is improved    ml/min.   There is  progression . No uremic symptoms    No evidence of anemia .  Fluid status stable.  Will continue to avoid nephrotoxic drugs.  Patient remains asymptomatic.   Continue current therapy. will f/u

## 2018-12-14 NOTE — PROGRESS NOTE ADULT - NSHPATTENDINGPLANDISCUSS_GEN_ALL_CORE
med staff , Dr King ,spoke to the wife Elaine in length on a phone [phone] ,case d/w social service on [address]

## 2018-12-14 NOTE — PROGRESS NOTE ADULT - PROBLEM SELECTOR PLAN 7
continue home medications
continue home medications
continue current management and medications ,neurology consult for comanagement of dementia
continue current management and medications ,neurology consult for comanagement of dementia
Gastrointestinal stress ulcer prophylaxis l and DVT prophylaxis administrated

## 2018-12-14 NOTE — PROGRESS NOTE ADULT - REASON FOR ADMISSION
poor po intake and lethargy

## 2018-12-14 NOTE — PROGRESS NOTE ADULT - PROBLEM SELECTOR PLAN 3
on iv zosyn ,further abx maangeemnt as per ID cons ,seen by  and US was done ,patient has   e coli UTI and left epidymo orchitis  ,  no retention no tumor or torsion ,bladder scan was 69 ml

## 2018-12-14 NOTE — DISCHARGE NOTE ADULT - MEDICATION SUMMARY - MEDICATIONS TO TAKE
I will START or STAY ON the medications listed below when I get home from the hospital:    acetaminophen 325 mg oral tablet  -- 2 tab(s) by mouth every 6 hours, As needed, Temp greater or equal to 38C (100.4F), Mild Pain (1 - 3)  -- Indication: For Pain ,fever    tamsulosin 0.4 mg oral capsule  -- 2 cap(s) by mouth once a day (at bedtime)  -- Indication: For BPH (benign prostatic hyperplasia)    valproic acid 250 mg/5 mL oral syrup  -- 10 milliliter(s) by mouth   -- Indication: For Alzheimer disease with behavioral problems    valproic acid 250 mg/5 mL oral syrup  -- 20 milliliter(s) by mouth once a day (at bedtime)  -- Indication: For Alzheimer disease with behavioral problems    QUEtiapine 100 mg oral tablet  -- 1 tab(s) by mouth once a day (at bedtime)  -- Indication: For Mood disorder    risperiDONE 1 mg oral tablet, disintegrating  -- 1 tab(s) by mouth once a day  -- Indication: For Mood disorder    risperiDONE 1 mg oral tablet, disintegrating  -- 1 tab(s) by mouth   -- Indication: For Agitation    cefpodoxime 200 mg oral tablet  -- 1 tab(s) by mouth every 12 hours x 7 days   -- Indication: For UTI (urinary tract infection)    polyethylene glycol 3350 oral powder for reconstitution  -- 17 gram(s) by mouth 2 times a day  -- Indication: For Constipation    senna oral tablet  -- 2 tab(s) by mouth once a day (at bedtime)  -- Indication: For Constipation    cyanocobalamin 500 mcg oral tablet  -- 1 tab(s) by mouth once a day  -- Indication: For Anemia

## 2018-12-14 NOTE — PROGRESS NOTE ADULT - PROBLEM SELECTOR PLAN 9
haldol  increased the dose prn ,psych cons is appreciated
haldol  increased the dose prn ,psych cons is appreciated and followup requested ( called 1N)
Gastrointestinal stress ulcer prophylaxis l and DVT prophylaxis administrated
Gastrointestinal stress ulcer prophylaxis l and DVT prophylaxis administrated

## 2018-12-14 NOTE — DISCHARGE NOTE ADULT - CARE PROVIDER_API CALL
Breana Marcos (DO), Internal Medicine  [address] [place] [id number]  Phone: [phone]  Fax: [phone]

## 2018-12-14 NOTE — DISCHARGE NOTE ADULT - MEDICATION SUMMARY - MEDICATIONS TO CHANGE
I will SWITCH the dose or number of times a day I take the medications listed below when I get home from the hospital:    Tylenol 325 mg oral tablet  -- 2 tab(s) by mouth every 6 hours    traMADol 50 mg oral tablet  -- 0.5 tab(s) by mouth every 6 hours    divalproex sodium 500 mg oral tablet, extended release  -- 2 tab(s) by mouth once a day (at bedtime)    divalproex sodium 500 mg oral tablet, extended release  -- 1 tab(s) by mouth 2 times a day 9 am and 1pm

## 2018-12-14 NOTE — DISCHARGE NOTE ADULT - HOSPITAL COURSE
72 yo AAM with hx of ALZHEIMER'S DISEASE ,SUBDURAL HEMATOMA, HTN, CAD ,GERD , HARISH ,VIT B12 DEFICIENCY ,GOUT ,CONSTIPATION .brought to ED from [facility] due to poor po intake and lethargy ,worsening for 3 days . Patient refused iv line placement and labs due to severe agitation and dementia ,[facility]  combative and aggressive with medical staff and psychiatric evaluation is  requested Admit for iv hydration, monitor renal profile and urine output ,nutritionist consult ,prealbumin level ,serial bmp, nutritional supplements, multivitamins ,palliative care evaluation  regarding MOLST completion and artificial nutrition discussion . (06 Dec 2018 15:20)hospital course-  D5 100 (12/6)  ch D5 75 (12/6)   12/6-12/7-12/8-12/9-12/11 Na [phone]  HARISH   CPAP 10 (12/5) ordered empirically   12/7/2018 DW pt spouse last night She agreed that pt likely will not keep cpap on   mood dz ,alzhemers dz with behavioral issues   Quetiapine 100 (12/6)   depakote 500.2 (12/6)   depakote 1000 (12/6)   Haldol 1.4p (12/6)   BPH  Tamsulosin .4 (12/6)   uti and epididimytis /L orchitis   12/13/2018 us testicles bl epididymal enlargement with hyperemia   12/11 CXR   12/11 CT ch L basal ssa or pneumonia   12/11 u 100K E coli Nitr pos Mod L estrase Large bld   12/11 ua Over 50 w   Dehydration resolving Hypernatremia improving   HARISH   Patient not using CPAP but has remained clinically stable   12/13/2018 Seen by ANDRADE Ramesh changed to vantin prior to discharge  12/12/2018 dw Dr Marcos Start zosyn to cover uti and asp pneum  NEUROPSYCHOTROPIC   On meds agitation improved   BPH  Tamsulosin .4 (12/6) Palliative care consult requested ,to discuss advance directives and complete MOLST Wife refused NGT/GT , MOLST was completed -DNR/DNI/DNH ,wife requests patient to return to rehab facilities /will require LTC placement

## 2018-12-14 NOTE — PROGRESS NOTE ADULT - ASSESSMENT
72 yo AAM with hx of ALZHEIMER'S DISEASE ,SUBDURAL HEMATOMA, HTN, CAD ,GERD , HARISH ,VIT B12 DEFICIENCY ,GOUT ,CONSTIPATION .brought to ED from [facility] due to poor po intake and lethargy ,worsening for 3 days . Patient refused iv line placement and labs due to severe agitation and dementia ,in [facility] he was  combative and aggressive with medical staff and psychiatric evaluation is  requested Admit for iv hydration, monitor renal profile and urine output ,nutritionist consult ,prealbumin level ,serial bmp, nutritional supplements, multivitamins ,palliative care evaluation  regarding MOLST completion and artificial nutrition discussion .
72 yo AAM with hx of ALZHEIMER'S DISEASE ,SUBDURAL HEMATOMA, HTN, CAD ,GERD , HARISH ,VIT B12 DEFICIENCY ,GOUT ,CONSTIPATION .brought to ED from [facility] due to poor po intake and lethargy ,worsening for 3 days . Patient refused iv line placement and labs due to severe agitation and dementia ,in [facility] he was  combative and aggressive with medical staff and psychiatric evaluation is  requested Admit for iv hydration, monitor renal profile and urine output ,nutritionist consult ,prealbumin level ,serial bmp, nutritional supplements, multivitamins ,palliative care evaluation  regarding MOLST completion and artificial nutrition discussion .
74 yo AAM with hx of ALZHEIMER'S DISEASE ,SUBDURAL HEMATOMA, HTN, CAD ,GERD , HARISH ,VIT B12 DEFICIENCY ,GOUT ,CONSTIPATION .brought to ED from [facility] due to poor po intake and lethargy ,worsening for 3 days . Patient refused iv line placement and labs due to severe agitation and dementia ,in [facility] he was  combative and aggressive with medical staff and psychiatric evaluation is  requested Admit for iv hydration, now with hypernatremia ,  [place]
74 yo AAM with hx of ALZHEIMER'S DISEASE ,SUBDURAL HEMATOMA, HTN, CAD ,GERD , HARISH ,VIT B12 DEFICIENCY ,GOUT ,CONSTIPATION .brought to ED from [facility] due to poor po intake and lethargy ,worsening for 3 days . Patient refused iv line placement and labs due to severe agitation and dementia ,in [facility] he was  combative and aggressive with medical staff and psychiatric evaluation is  requested Admit for iv hydration, monitor renal profile and urine output ,nutritionist consult ,prealbumin level ,serial bmp, nutritional supplements, multivitamins ,palliative care evaluation  regarding MOLST completion and artificial nutrition discussion .
74 yo AAM with hx of ALZHEIMER'S DISEASE ,SUBDURAL HEMATOMA, HTN, CAD ,GERD , HARISH ,VIT B12 DEFICIENCY ,GOUT ,CONSTIPATION .brought to ED from [facility] due to poor po intake and lethargy ,worsening for 3 days . Patient refused iv line placement and labs due to severe agitation and dementia ,in [facility] he was  combative and aggressive with medical staff and psychiatric evaluation is  requested Admit for iv hydration, monitor renal profile and urine output ,nutritionist consult ,prealbumin level ,serial bmp, nutritional supplements, multivitamins ,palliative care evaluation  regarding MOLST completion and artificial nutrition discussion .
74 yo AAM with hx of ALZHEIMER'S DISEASE ,SUBDURAL HEMATOMA, HTN, CAD ,GERD , HARISH ,VIT B12 DEFICIENCY ,GOUT ,CONSTIPATION .brought to ED from [facility] due to poor po intake and lethargy ,worsening for 3 days . Patient refused iv line placement and labs due to severe agitation and dementia ,in [facility] he was  combative and aggressive with medical staff and psychiatric evaluation is  requested Admit for iv hydration, monitor renal profile and urine output ,nutritionist consult ,prealbumin level ,serial bmp, nutritional supplements, multivitamins ,palliative care evaluation  regarding MOLST completion and artificial nutrition discussion .
74 yo AAM with hx of ALZHEIMER'S DISEASE ,SUBDURAL HEMATOMA, HTN, CAD ,GERD , HARISH ,VIT B12 DEFICIENCY ,GOUT ,CONSTIPATION .brought to ED from [facility] due to poor po intake and lethargy ,worsening for 3 days . Patient refused iv line placement and labs due to severe agitation and dementia ,in [facility] he was  combative and aggressive with medical staff and psychiatric evaluation is  requested Admit for iv hydration, now with hypernatremia ,  [place]
74 yo AAM with hx of ALZHEIMER'S DISEASE ,SUBDURAL HEMATOMA, HTN, CAD ,GERD , HARISH ,VIT B12 DEFICIENCY ,GOUT ,CONSTIPATION .brought to ED from [facility] due to poor po intake and lethargy ,worsening for 3 days . Patient refused iv line placement and labs due to severe agitation and dementia ,in [facility] he was  combative and aggressive with medical staff and psychiatric evaluation is  requested Admit for iv hydration, now with hypernatremia ,  [place]
74 yo AAM with hx of ALZHEIMER'S DISEASE ,SUBDURAL HEMATOMA, HTN, CAD ,GERD , HARISH ,VIT B12 DEFICIENCY ,GOUT ,CONSTIPATION .brought to ED from [facility] due to poor po intake and lethargy ,worsening for 3 days . Patient refused iv line placement and labs due to severe agitation and dementia ,in [facility] he was  combative and aggressive with medical staff and psychiatric evaluation is  requested Admit for iv hydration, now with hypernatremia ,  [place]
72 yo AAM with hx of ALZHEIMER'S DISEASE ,SUBDURAL HEMATOMA, HTN, CAD ,GERD , HARISH ,VIT B12 DEFICIENCY ,GOUT ,CONSTIPATION .brought to ED from [facility] due to poor po intake and lethargy ,worsening for 3 days . Patient refused iv line placement and labs due to severe agitation and dementia ,in [facility] he was  combative and aggressive with medical staff and psychiatric evaluation is  requested Admit for iv hydration, now with hypernatremia ,  [place]
72 yo AAM with hx of ALZHEIMER'S DISEASE ,SUBDURAL HEMATOMA, HTN, CAD ,GERD , HARISH ,VIT B12 DEFICIENCY ,GOUT ,CONSTIPATION .brought to ED from [facility] due to poor po intake and lethargy ,worsening for 3 days . Patient refused iv line placement and labs due to severe agitation and dementia ,in [facility] he was  combative and aggressive with medical staff and psychiatric evaluation is  requested Admit for iv hydration, monitor renal profile and urine output ,nutritionist consult ,prealbumin level ,serial bmp, nutritional supplements, multivitamins ,palliative care evaluation  regarding MOLST completion and artificial nutrition discussion .
74 yo AAM with hx of ALZHEIMER'S DISEASE ,SUBDURAL HEMATOMA, HTN, CAD ,GERD , HARISH ,VIT B12 DEFICIENCY ,GOUT ,CONSTIPATION .brought to ED from [facility] due to poor po intake and lethargy ,worsening for 3 days . Patient refused iv line placement and labs due to severe agitation and dementia ,in [facility] he was  combative and aggressive with medical staff and psychiatric evaluation is  requested Admit for iv hydration, now with hypernatremia ,  [place]
72 yo AAM with hx of ALZHEIMER'S DISEASE ,SUBDURAL HEMATOMA, HTN, CAD ,GERD , HARISH ,VIT B12 DEFICIENCY ,GOUT ,CONSTIPATION .brought to ED from [facility] due to poor po intake and lethargy ,worsening for 3 days . Patient refused iv line placement and labs due to severe agitation and dementia ,in [facility] he was  combative and aggressive with medical staff and psychiatric evaluation is  requested Admit for iv hydration, monitor renal profile and urine output ,nutritionist consult ,prealbumin level ,serial bmp, nutritional supplements, multivitamins ,palliative care evaluation  regarding MOLST completion and artificial nutrition discussion .
74 yo AAM with hx of ALZHEIMER'S DISEASE ,SUBDURAL HEMATOMA, HTN, CAD ,GERD , HARISH ,VIT B12 DEFICIENCY ,GOUT ,CONSTIPATION .brought to ED from [facility] due to poor po intake and lethargy ,worsening for 3 days . Patient refused iv line placement and labs due to severe agitation and dementia ,in [facility] he was  combative and aggressive with medical staff and psychiatric evaluation is  requested Admit for iv hydration, now with hypernatremia ,  [place]
74 yo AAM with hx of ALZHEIMER'S DISEASE ,SUBDURAL HEMATOMA, HTN, CAD ,GERD , HARISH ,VIT B12 DEFICIENCY ,GOUT ,CONSTIPATION .brought to ED from [facility] due to poor po intake and lethargy ,worsening for 3 days . Patient refused iv line placement and labs due to severe agitation and dementia ,in [facility] he was  combative and aggressive with medical staff and psychiatric evaluation is  requested Admit for iv hydration, now with hypernatremia ,  [place]
74 yo AAM with hx of ALZHEIMER'S DISEASE ,SUBDURAL HEMATOMA, HTN, CAD ,GERD , HARISH ,VIT B12 DEFICIENCY ,GOUT ,CONSTIPATION .brought to ED from [facility] due to poor po intake and lethargy ,worsening for 3 days . Patient refused iv line placement and labs due to severe agitation and dementia ,in [facility] he was  combative and aggressive with medical staff and psychiatric evaluation is  requested Admit for iv hydration, monitor renal profile and urine output ,nutritionist consult ,prealbumin level ,serial bmp, nutritional supplements, multivitamins ,palliative care evaluation  regarding MOLST completion and artificial nutrition discussion .

## 2018-12-14 NOTE — PROGRESS NOTE ADULT - PROBLEM SELECTOR PLAN 8
continue current management and medications ,neurology consult for comanagement of dementia
continue current management and medications ,neurology consult for comanagement of dementia
haldol  increased the dose prn ,psych cons is appreciated
haldol  increased the dose prn ,psych cons is appreciated

## 2018-12-14 NOTE — PROGRESS NOTE ADULT - SUBJECTIVE AND OBJECTIVE BOX
CHIEF COMPLAINT/ PRESENT FINDINGS:          ****************************************************************************************************  PHYSICAL EXAM:    Vital Signs Last 24 Hrs  T(C): 36.4 (14 Dec 2018 05:20), Max: 36.9 (13 Dec 2018 09:15)  T(F): 97.5 (14 Dec 2018 05:20), Max: 98.5 (13 Dec 2018 17:23)  HR: 63 (14 Dec 2018 05:20) (63 - 98)  BP: 129/73 (14 Dec 2018 05:20) (118/66 - 160/79)  BP(mean): --  RR: 18 (14 Dec 2018 05:20) (18 - 20)  SpO2: 95% (14 Dec 2018 05:20) (94% - 100%)    GENERAL: non communicative and combative    PENIS:    TESTES: left hard    PROSTATE/ RECTAL:    ABDOMEN: soft     BACK:    LOWER EXTREMITIES:    NEUROLOGICAL:    **********************************************************************************************************  LABS:                        11.9   11.97 )-----------( 286      ( 14 Dec 2018 08:39 )             37.2     12-13    141  |  104  |  12  ----------------------------<  134<H>  4.3   |  33<H>  |  1.12    Ca    8.7      13 Dec 2018 08:01    TPro  6.6  /  Alb  2.0<L>  /  TBili  0.5  /  DBili  x   /  AST  23  /  ALT  38  /  AlkPhos  53  12-13    PT/INR - ( 13 Dec 2018 08:01 )   PT: 12.2 sec;   INR: 1.12 ratio             Urine Culture: 12-11 @ 21:17  Urine Culure Results   >100,000 CFU/ml Escherichia coli  Organism Escherichia coli      RADIOLOGY & ADDITIONAL STUDIES:      EXAM:  US SCROTUM AND CONTENTS                                  PROCEDURE DATE:  12/13/2018          INTERPRETATION:   Clinical information: Urinary tract infection    Transverse and longitudinal images obtained. Duplex sonography performed,   color flow Doppler and spectral Doppler images obtained.    Right testicle: Right testicle measures 3.4 x 2.1 x 2.8 cm. Parenchyma is   homogeneous. Small right-sided varicocele present. Small right-sided   hydrocele present. The epididymis is enlarged and hyperemic. Vascular   flow evident in the right testicle.        Left testicle: Left testicle measures 3.4 x 2.2 x 2.3 cm. Parenchyma is   homogeneous. The left epididymis is enlarged containing a 6 mm cyst. Left   epididymis is hyperemic. A small left-sided hydrocele is present.   Vascular flow evident in the left testicle.    IMPRESSION:    Bilateral epididymal enlargement with hyperemia.        EXAM:  US URINARY BLADDER                                  PROCEDURE DATE:  12/13/2018          INTERPRETATION:  Clinical information: Urinary retention    Transverse and longitudinal images obtained.    Technical note indicates patient unable to cooperate.    Urinary bladder contained 69 cc of urine. No stones evident. Small debris   level visible within the urinary bladder. Postvoid image was not obtained.    IMPRESSION:    See above report.                  DM SMITH M.D.,ATTENDING RADIOLOGIST  This document has been electronically signed. Dec 13 2018 10:24AM                        DM SMITH M.D.,ATTENDING RADIOLOGIST  This document has been electronically signed. Dec 13 2018 11:52AM        IMPRESSION:  dementia  e coli UTI and left epiidymo orchitis   no retention no tumor or torsion     PLAN: on antibiotics rocephin   on flomax

## 2018-12-14 NOTE — PROGRESS NOTE ADULT - SUBJECTIVE AND OBJECTIVE BOX
PROGRESS NOTE  Patient is a 73y old  Male who presents with a chief complaint of poor po intake and lethargy (14 Dec 2018 08:54)  Chart and available morning labs /imaging are reviewed electronically , urgent issues addressed . More information  is being added upon completion of rounds , when more information is collected and management discussed with consultants , medical staff and social service/case management on the floor   OVERNIGHT  No new issues reported by medical staff . All above noted Patient is resting in a bed comfortably .Confused ,poor mentation .No distress noted   Minimal oral intake ,as per wife he takes desserts from her   HPI:  74 yo AAM with hx of ALZHEIMER'S DISEASE ,SUBDURAL HEMATOMA, HTN, CAD ,GERD , HARISH ,VIT B12 DEFICIENCY ,GOUT ,CONSTIPATION .brought to ED from [facility] due to poor po intake and lethargy ,worsening for 3 days . Patient refused iv line placement and labs due to severe agitation and dementia ,in [facility] he was  combative and aggressive with medical staff and psychiatric evaluation is  requested Admit for iv hydration, monitor renal profile and urine output ,nutritionist consult ,prealbumin level ,serial bmp, nutritional supplements, multivitamins ,palliative care evaluation  regarding MOLST completion and artificial nutrition discussion . (06 Dec 2018 15:20)  PAST MEDICAL & SURGICAL HISTORY:  Alzheimer disease  Urinary retention  BPH (benign prostatic hyperplasia)  Constipation  HTN (hypertension)  SDH (subdural hematoma)  HARISH (obstructive sleep apnea)  GERD (gastroesophageal reflux disease)  Agitation  Dementia  ASHD (arteriosclerotic heart disease)  Gout  MEDICATIONS  (STANDING):  cefTRIAXone   IVPB 1 Gram(s) IV Intermittent every 24 hours  cyanocobalamin 500 MICROGram(s) Oral daily  dextrose 5%. 1000 milliLiter(s) (50 mL/Hr) IV Continuous <Continuous>  famotidine    Tablet 20 milliGRAM(s) Oral daily  heparin  Injectable 5000 Unit(s) SubCutaneous every 12 hours  polyethylene glycol 3350 17 Gram(s) Oral two times a day  QUEtiapine 100 milliGRAM(s) Oral at bedtime  risperiDONE  Disintegrating Tablet 1 milliGRAM(s) Oral daily  risperiDONE  Disintegrating Tablet 1 milliGRAM(s) Oral <User Schedule>  senna 2 Tablet(s) Oral at bedtime  tamsulosin 0.8 milliGRAM(s) Oral at bedtime  valproic  acid Syrup 500 milliGRAM(s) Oral <User Schedule>  valproic  acid Syrup 1000 milliGRAM(s) Oral at bedtime  MEDICATIONS  (PRN):  acetaminophen   Tablet .. 650 milliGRAM(s) Oral every 6 hours PRN Temp greater or equal to 38C (100.4F), Mild Pain (1 - 3)  haloperidol    Injectable 3 milliGRAM(s) IntraMuscular every 4 hours PRN severe agitation like pulling lines  OBJECTIVE  T(C): 36.4 (12-14-18 @ 05:20), Max: 36.9 (12-13-18 @ 09:15)  HR: 63 (12-14-18 @ 05:20) (63 - 98)  BP: 129/73 (12-14-18 @ 05:20) (118/66 - 160/79)  RR: 18 (12-14-18 @ 05:20) (18 - 20)  SpO2: 95% (12-14-18 @ 05:20) (94% - 100%)  Wt(kg): --  I&O's Summary  13 Dec 2018 07:01  -  14 Dec 2018 07:00  --------------------------------------------------------  IN: 1200 mL / OUT: 0 mL / NET: 1200 mL  REVIEW OF SYSTEMS:  ROS is unobtainable due to lethargy and confusion   Periods of agitation   PHYSICAL EXAM:  Appearance: NAD. VS past 24 hrs -as above   HEENT:   Moist oral mucosa. Conjunctiva clear b/l.   Neck : supple  Respiratory: Lungs CTAB.  Gastrointestinal:  Soft, nontender. No rebound. No rigidity. BS present	  Cardiovascular: RRR ,S1S2 present  Neurologic: Non-focal. Moving all extremities.  Extremities: No edema. No erythema. No calf tenderness.  Skin: No rashes, No ecchymoses, No cyanosis.	  wounds ,skin lesions-See skin assesment flow sheet   LABS:                        11.9   11.97 )-----------( 286      ( 14 Dec 2018 08:39 )             37.2     12-13    141  |  104  |  12  ----------------------------<  134<H>  4.3   |  33<H>  |  1.12    Ca    8.7      13 Dec 2018 08:01    TPro  6.6  /  Alb  2.0<L>  /  TBili  0.5  /  DBili  x   /  AST  23  /  ALT  38  /  AlkPhos  53  12-13    CAPILLARY BLOOD GLUCOSE        PT/INR - ( 13 Dec 2018 08:01 )   PT: 12.2 sec;   INR: 1.12 ratio               Culture - Urine (collected 11 Dec 2018 21:17)  Source: .Urine Clean Catch (Midstream)  Final Report (13 Dec 2018 17:02):    >100,000 CFU/ml Escherichia coli  Organism: Escherichia coli (13 Dec 2018 17:02)  Organism: Escherichia coli (13 Dec 2018 17:02)      RADIOLOGY & ADDITIONAL TESTS:< from: US Testicles (12.13.18 @ 10:30) >  EXAM:  US SCROTUM AND CONTENTS                                  PROCEDURE DATE:  12/13/2018          INTERPRETATION:   Clinical information: Urinary tract infection    Transverse and longitudinal images obtained. Duplex sonography performed,   color flow Doppler and spectral Doppler images obtained.    Right testicle: Right testicle measures 3.4 x 2.1 x 2.8 cm. Parenchyma is   homogeneous. Small right-sided varicocele present. Small right-sided   hydrocele present. The epididymis is enlarged and hyperemic. Vascular   flow evident in the right testicle.        Left testicle: Left testicle measures 3.4 x 2.2 x 2.3 cm. Parenchyma is   homogeneous. The left epididymis is enlarged containing a 6 mm cyst. Left   epididymis is hyperemic. A small left-sided hydrocele is present.   Vascular flow evident in the left testicle.    IMPRESSION:    Bilateral epididymal enlargement with hyperemia.    < end of copied text >     reviewed elctronically  ASSESSMENT/PLAN:

## 2018-12-14 NOTE — PROGRESS NOTE ADULT - PROVIDER SPECIALTY LIST ADULT
Hospitalist
Infectious Disease
Nephrology
Neurology
Pulmonology
Urology
Neurology
Pulmonology
Pulmonology
Internal Medicine
Nephrology
Nephrology
Hospitalist
Nephrology
Hospitalist
Nephrology
Hospitalist

## 2018-12-14 NOTE — PROGRESS NOTE ADULT - SUBJECTIVE AND OBJECTIVE BOX
ID Progress note     Name: NILES THOMAS  Age: 73y  Gender: Male  MRN: [id number]    Interval History-- Events noted , remians obtunded. Wife agreed for MOLST with DNR/DNI, and limited medical intervention and no GT Tube. Afebrile   Notes reviewed    Past Medical History--  Alzheimer disease  Urinary retention  BPH (benign prostatic hyperplasia)  Constipation  HTN (hypertension)  SDH (subdural hematoma)  HARISH (obstructive sleep apnea)  GERD (gastroesophageal reflux disease)  Agitation  Dementia  ASHD (arteriosclerotic heart disease)  Gout      For details regarding the patient's social history, family history, and other miscellaneous elements, please refer the initial infectious diseases consultation and/or the admitting history and physical examination for this admission.    Allergies--  Allergies    No Known Allergies    Intolerances        Medications--  Antibiotics:  cefTRIAXone   IVPB 1 Gram(s) IV Intermittent every 24 hours    Immunologic:    Other:  acetaminophen   Tablet .. PRN  cyanocobalamin  dextrose 5%.  famotidine    Tablet  haloperidol    Injectable PRN  heparin  Injectable  polyethylene glycol 3350  QUEtiapine  risperiDONE  Disintegrating Tablet  risperiDONE  Disintegrating Tablet  senna  tamsulosin  valproic  acid Syrup  valproic  acid Syrup      Review of Systems--  Review of systems unable to be obtained secondary to clinical condition.    Physical Examination--    Vital Signs: T(F): 97.5 (12-14-18 @ 05:20), Max: 98.5 (12-13-18 @ 17:23)  HR: 63 (12-14-18 @ 05:20)  BP: 129/73 (12-14-18 @ 05:20)  RR: 18 (12-14-18 @ 05:20)  SpO2: 95% (12-14-18 @ 05:20)  Wt(kg): --  General: Nontoxic-appearing Male in no acute distress.  HEENT: AT/NC. PERRL. EOMI. Anicteric. Conjunctiva pink and moist. Oropharynx clear. Dentition fair.  Neck: Not rigid. No sense of mass.  Nodes: None palpable.  Lungs: Clear bilaterally without rales, wheezing or rhonchi  Heart: Regular rate and rhythm. No Murmur. No rub. No gallop. No palpable thrill.  Abdomen: Bowel sounds present and normoactive. Soft. Nondistended. Nontender. No sense of mass. No organomegaly.  Back: No spinal tenderness. No costovertebral angle tenderness.   Extremities: No cyanosis or clubbing. No edema.   Skin: Warm. Dry. Good turgor. No rash. No vasculitic stigmata.  Psychiatric: Appropriate affect and mood for situation.         Laboratory Studies--  CBC                        11.9   11.97 )-----------( 286      ( 14 Dec 2018 08:39 )             37.2       Chemistries  12-14    143  |  104  |  8   ----------------------------<  156<H>  3.8   |  34<H>  |  1.11    Ca    8.9      14 Dec 2018 08:39    TPro  6.6  /  Alb  2.0<L>  /  TBili  0.5  /  DBili  x   /  AST  23  /  ALT  38  /  AlkPhos  53  12-13          Recent Cultures:    Culture - Urine (12.11.18 @ 21:17)    -  Amikacin: S <=8    -  Amoxicillin/Clavulanic Acid: S <=8/4    -  Ampicillin: S <=2 These ampicillin results predict results for amoxicillin    -  Ampicillin/Sulbactam: S <=4/2    -  Aztreonam: S <=4    -  Cefazolin: S <=2 For uncomplicated UTI with K. pneumoniae, E. coli, or P. mirablis: LINDSEY <=16 is sensitive and LINDSEY >=32 is resistant. This also predicts results for oral agents cefaclor, cefdinir, cefpodoxime, cefprozil, cefuroxime axetil, cephalexin and locarbef for uncomplicated UTI. Note that some isolates may be susceptible to these agents while testing resistant to cefazolin.    -  Cefepime: S <=2    -  Cefoxitin: S <=4    -  Ceftriaxone: S <=1 Enterobacter, Citrobacter, and Serratia may develop resistance during prolonged therapy    -  Ciprofloxacin: S <=0.5    -  Ertapenem: S <=0.5    -  Gentamicin: S <=1    -  Imipenem: S <=1    -  Levofloxacin: S <=1    -  Meropenem: S <=1    -  Nitrofurantoin: S <=32 Should not be used to treat pyelonephritis    -  Piperacillin/Tazobactam: S <=8    -  Tigecycline: S <=1    -  Tobramycin: S <=2    -  Trimethoprim/Sulfamethoxazole: S <=0.5/9.5    Specimen Source: .Urine Clean Catch (Midstream)    Culture Results:   >100,000 CFU/ml Escherichia coli    Organism Identification: Escherichia coli    Organism: Escherichia coli    Method Type: LINDSEY          Radiology:  US Testicles (12.13.18 @ 10:30) >  Right testicle: Right testicle measures 3.4 x 2.1 x 2.8 cm. Parenchyma is   homogeneous. Small right-sided varicocele present. Small right-sided   hydrocele present. The epididymis is enlarged and hyperemic. Vascular   flow evident in the right testicle.        Left testicle: Left testicle measures 3.4 x 2.2 x 2.3 cm. Parenchyma is   homogeneous. The left epididymis is enlarged containing a 6 mm cyst. Left   epididymis is hyperemic. A small left-sided hydrocele is present.   Vascular flow evident in the left testicle.    IMPRESSION:    Bilateral epididymal enlargement with hyperemia.    CT Chest No Cont (12.11.18 @ 14:11) >  Noncontrast chest CT.    There are streaky fibrotic/atelectatic changes at the dependent lung   bases. There is additionalsmall patchy opacity in the left lower lobe   posteriorly which may represent subsegmental atelectasis and/or   pneumonia. Correlate clinically for active infection. No pleural   collections.  Central airways patent. No mediastinal adenopathy. Hilar evaluation   limited without IV contrast. Nonaneurysmal thoracic aorta. Normal heart   size with coronary artery calcification. Trace pericardial effusion   versus pericardial thickening.  Punctate nonobstructive left renal calculus.  Degenerative change dorsal spine.    Impression:  Left basilar subsegmental atelectasis and/or pneumonia.  Additional findings as discussed.      Assessment :     72 yo AAM with hx of ALZHEIMER'S DISEASE ,SUBDURAL HEMATOMA, HTN, CAD ,GERD , HARISH ,VIT B12 DEFICIENCY ,GOUT ,CONSTIPATION .brought to ED from [facility] admitted with AMS and agitation ,seen by psych and placed on Risperdal and Seroquel , noted ot have leukocytosis on 12/11/18 when ready for dc noted to have  infection with UTI and left testicular swelling likely epididymo orchitis    He appears oversedated and at high Risk for aspiration     Family is considering Comfort measures vs placement of PEG      Leukocytosis is likley from  in infection     Plan :   - will change to IV Rocephin 1 gram daily, can be changed to po Vantin 200 mg bid x 7 more days from tomorrow   - possible dc back to SNF with limited medical intervention   - palliative care follow up , need to have meeting with family to decide goals of care   - trend cbc    Continue with present regime .  Appropriate use of antibiotics and adverse effects reviewed.    I have discussed the above plan of care with patient's family in detail. They expressed understanding of the treatment plan . Risks, benefits and alternatives discussed in detail. I have asked if they have any questions or concerns and appropriately addressed them to the best of my ability .      > 35 minutes spent in direct patient care reviewing  the notes, lab data/ imaging , discussion with multidisciplinary team. All questions were addressed and answered to the best of my capacity .    Thank you for allowing me to participate in the care of your patient .        Konstantin Cotter MD  [phone]

## 2018-12-14 NOTE — PROGRESS NOTE ADULT - SUBJECTIVE AND OBJECTIVE BOX
Patient is a 73y Male whom presented to the hospital with ckd and haleigh . hypernatremia   pt seen in am nad , no fever   PAST MEDICAL & SURGICAL HISTORY:  Alzheimer disease  Urinary retention  BPH (benign prostatic hyperplasia)  Constipation  HTN (hypertension)  SDH (subdural hematoma)  HARISH (obstructive sleep apnea)  GERD (gastroesophageal reflux disease)  Agitation  Dementia  ASHD (arteriosclerotic heart disease)  Gout      MEDICATIONS  (STANDING):  cyanocobalamin 500 MICROGram(s) Oral daily  dextrose 5%. 1000 milliLiter(s) (100 mL/Hr) IV Continuous <Continuous>  diVALproex  milliGRAM(s) Oral <User Schedule>  diVALproex DR 1000 milliGRAM(s) Oral at bedtime  famotidine    Tablet 20 milliGRAM(s) Oral daily  heparin  Injectable 5000 Unit(s) SubCutaneous every 12 hours  polyethylene glycol 3350 17 Gram(s) Oral two times a day  QUEtiapine 100 milliGRAM(s) Oral at bedtime  senna 2 Tablet(s) Oral at bedtime  tamsulosin 0.8 milliGRAM(s) Oral at bedtime                              11.9   11.97 )-----------( 286      ( 14 Dec 2018 08:39 )             37.2       CBC Full  -  ( 14 Dec 2018 08:39 )  WBC Count : 11.97 K/uL  Hemoglobin : 11.9 g/dL  Hematocrit : 37.2 %  Platelet Count - Automated : 286 K/uL  Mean Cell Volume : 92.5 fl  Mean Cell Hemoglobin : 29.6 pg  Mean Cell Hemoglobin Concentration : 32.0 gm/dL  Auto Neutrophil # : 9.54 K/uL  Auto Lymphocyte # : 0.84 K/uL  Auto Monocyte # : 1.28 K/uL  Auto Eosinophil # : 0.00 K/uL  Auto Basophil # : 0.05 K/uL  Auto Neutrophil % : 79.7 %  Auto Lymphocyte % : 7.0 %  Auto Monocyte % : 10.7 %  Auto Eosinophil % : 0.0 %  Auto Basophil % : 0.4 %      12-14    143  |  104  |  8   ----------------------------<  156<H>  3.8   |  34<H>  |  1.11    Ca    8.9      14 Dec 2018 08:39    TPro  6.6  /  Alb  2.0<L>  /  TBili  0.5  /  DBili  x   /  AST  23  /  ALT  38  /  AlkPhos  53  12-13      CAPILLARY BLOOD GLUCOSE          Vital Signs Last 24 Hrs  T(C): 36.4 (14 Dec 2018 05:20), Max: 36.9 (13 Dec 2018 09:15)  T(F): 97.5 (14 Dec 2018 05:20), Max: 98.5 (13 Dec 2018 17:23)  HR: 63 (14 Dec 2018 05:20) (63 - 98)  BP: 129/73 (14 Dec 2018 05:20) (118/66 - 160/79)  BP(mean): --  RR: 18 (14 Dec 2018 05:20) (18 - 20)  SpO2: 95% (14 Dec 2018 05:20) (94% - 100%)        PT/INR - ( 13 Dec 2018 08:01 )   PT: 12.2 sec;   INR: 1.12 ratio                   PHYSICAL EXAM:    Constitutional: NAD  HEENT: conjunctive   clear   Neck:  No JVD  Respiratory: decrease bs b/l   Cardiovascular: S1 and S2  Gastrointestinal: BS+, soft, NT/ND  Extremities: No peripheral edema

## 2018-12-14 NOTE — PROGRESS NOTE ADULT - SUBJECTIVE AND OBJECTIVE BOX
Neurology follow up note    NILES [id number]      Interval History:    Patient resting in bed     MEDICATIONS    acetaminophen   Tablet .. 650 milliGRAM(s) Oral every 6 hours PRN  cefTRIAXone   IVPB 1 Gram(s) IV Intermittent every 24 hours  cyanocobalamin 500 MICROGram(s) Oral daily  dextrose 5%. 1000 milliLiter(s) IV Continuous <Continuous>  famotidine    Tablet 20 milliGRAM(s) Oral daily  haloperidol    Injectable 3 milliGRAM(s) IntraMuscular every 4 hours PRN  heparin  Injectable 5000 Unit(s) SubCutaneous every 12 hours  polyethylene glycol 3350 17 Gram(s) Oral two times a day  QUEtiapine 100 milliGRAM(s) Oral at bedtime  risperiDONE  Disintegrating Tablet 1 milliGRAM(s) Oral daily  risperiDONE  Disintegrating Tablet 1 milliGRAM(s) Oral <User Schedule>  senna 2 Tablet(s) Oral at bedtime  tamsulosin 0.8 milliGRAM(s) Oral at bedtime  valproic  acid Syrup 500 milliGRAM(s) Oral <User Schedule>  valproic  acid Syrup 1000 milliGRAM(s) Oral at bedtime      Allergies    No Known Allergies    Intolerances            Vital Signs Last 24 Hrs  T(C): 36.6 (14 Dec 2018 09:15), Max: 36.9 (13 Dec 2018 17:23)  T(F): 97.9 (14 Dec 2018 09:15), Max: 98.5 (13 Dec 2018 17:23)  HR: 69 (14 Dec 2018 09:15) (63 - 98)  BP: 98/63 (14 Dec 2018 09:15) (98/63 - 160/79)  BP(mean): --  RR: 18 (14 Dec 2018 09:15) (18 - 20)  SpO2: 95% (14 Dec 2018 05:20) (94% - 100%)    REVIEW OF SYSTEMS: Limited or unable to obtain secondary to patient's poor mental status.    On Neurological Examination:  The patient is arousable to verbal stimuli, opens eyes.    Extraocular movements were intact.  The patient will say irrelevant things.    Speech was fluent.  No dysarthria noted.    Motor, bilateral upper appeared to be 4/5.    Bilateral lower, plantar stimulation and flexation of the hip and knee was 3/5.    Did not notice any gross focality.    Exam is limited secondary to the patient not following commands.    Does not follow commands    GENERAL Exam: Nontoxic , No Acute Distress   	  HEENT:  normocephalic, atraumatic  		  LUNGS: Clear bilaterally    	  HEART: Normal S1S2   No murmur RRR        	  GI/ ABDOMEN:  Soft  Non tender    EXTREMITIES:   No Edema  No Clubbing  No Cyanosis     MUSCULOSKELETAL: decreased Range of Motion all 4 extremities   	   SKIN: Normal  No Ecchymosis               LABS:  CBC Full  -  ( 14 Dec 2018 08:39 )  WBC Count : 11.97 K/uL  Hemoglobin : 11.9 g/dL  Hematocrit : 37.2 %  Platelet Count - Automated : 286 K/uL  Mean Cell Volume : 92.5 fl  Mean Cell Hemoglobin : 29.6 pg  Mean Cell Hemoglobin Concentration : 32.0 gm/dL  Auto Neutrophil # : 9.54 K/uL  Auto Lymphocyte # : 0.84 K/uL  Auto Monocyte # : 1.28 K/uL  Auto Eosinophil # : 0.00 K/uL  Auto Basophil # : 0.05 K/uL  Auto Neutrophil % : 79.7 %  Auto Lymphocyte % : 7.0 %  Auto Monocyte % : 10.7 %  Auto Eosinophil % : 0.0 %  Auto Basophil % : 0.4 %      12-14    143  |  104  |  8   ----------------------------<  156<H>  3.8   |  34<H>  |  1.11    Ca    8.9      14 Dec 2018 08:39    TPro  6.6  /  Alb  2.0<L>  /  TBili  0.5  /  DBili  x   /  AST  23  /  ALT  38  /  AlkPhos  53  12-13    Hemoglobin A1C:     LIVER FUNCTIONS - ( 13 Dec 2018 08:01 )  Alb: 2.0 g/dL / Pro: 6.6 g/dL / ALK PHOS: 53 U/L / ALT: 38 U/L DA / AST: 23 U/L / GGT: x           Vitamin B12   PT/INR - ( 13 Dec 2018 08:01 )   PT: 12.2 sec;   INR: 1.12 ratio               RADIOLOGY    ANALYSIS AND PLAN:  A 73-year-old with episode of change in the mental status, history of subdural hematoma.  1.	For change in the mental status, suspect most likely secondary to progressive dementia along with underlying metabolic etiology, hypernatremia, and decreased oral intake.  Examination was limited but I do not see any clear signs to suggest new cerebrovascular accident had ensued.  2.	I would recommend IV fluid hydration.  3.	Monitor oral intake.  4.	Monitor sodium levels.  5.	For history of dementia, the patient did try medications in the past but appeared to have side effects secondary to the patient's age and it appeared to be advanced, did not feel that medications would be of any benefit.  6.	For history of subdural hematoma, as per my conversation with spouse, his last CT imaging showed had resolution of it.  7.	Spoke with spouse Elaine at bedside, her telephone number is [phone]. 12/10/18  She understands while in the hospital setting may become more disoriented.  8.	For agitation, continue the patient on his Depakote and Seroquel.  Haldol was added.  Make adjustments to medications as needed. Patient on Depakote as a mood stabilizer not for seizures ok to bojorquez to alt mood stabilizer psych noted plan to taper seroquel-- wll change depakote to liquid form   9.	psych follow up as needed  10.	poor po intake   11.	antibiotics as needed     Thank you for the courtesy of consultation.    Physical therapy evaluation as tolerated  OOB to chair/ambulation with assistance only if possible.    Greater than 40 minutes spent in direct patient care reviewing  the notes, lab data/ imaging , discussion with multidisciplinary team.

## 2018-12-14 NOTE — DISCHARGE NOTE ADULT - CARE PLAN
Principal Discharge DX:	Delirium due to multiple etiologies, acute, hyperactive  Goal:	resolution of confusion ,return to baseline  Assessment and plan of treatment:	continue current managmnet and medications ,followup with psychiatrist  Secondary Diagnosis:	VIRI (acute kidney injury)  Assessment and plan of treatment:	encourage po fluids  Secondary Diagnosis:	BPH (benign prostatic hyperplasia)  Assessment and plan of treatment:	continue current managmnet and medications  Secondary Diagnosis:	UTI (urinary tract infection)  Assessment and plan of treatment:	on vantin x 7 days  Secondary Diagnosis:	Epididymitis  Assessment and plan of treatment:	on vantin ,scrotal elevation recommended  Secondary Diagnosis:	HTN (hypertension)  Assessment and plan of treatment:	continue current managmnet and medications  Secondary Diagnosis:	HARISH (obstructive sleep apnea)  Assessment and plan of treatment:	HARISH   CPAP 10 (12/5) ordered empirically   12/7/2018 DW pt spouse  agreed that pt likely will not keep cpap on

## 2018-12-14 NOTE — DISCHARGE NOTE ADULT - PLAN OF CARE
resolution of confusion ,return to baseline continue current managmnet and medications ,followup with psychiatrist encourage po fluids continue current managmnet and medications on vantin x 7 days on vantin ,scrotal elevation recommended HARISH   CPAP 10 (12/5) ordered empirically   12/7/2018 DW pt spouse  agreed that pt likely will not keep cpap on

## 2018-12-14 NOTE — DISCHARGE NOTE ADULT - SECONDARY DIAGNOSIS.
VIRI (acute kidney injury) BPH (benign prostatic hyperplasia) UTI (urinary tract infection) Epididymitis HTN (hypertension) HARISH (obstructive sleep apnea)

## 2018-12-14 NOTE — PROGRESS NOTE ADULT - ATTENDING COMMENTS
72 yo AAM with hx of ALZHEIMER'S DISEASE ,SUBDURAL HEMATOMA, HTN, CAD ,GERD , HARISH ,VIT B12 DEFICIENCY ,GOUT ,CONSTIPATION .brought to ED from [facility] due to poor po intake and lethargy ,worsening for 3 days . Patient refused iv line placement and labs due to severe agitation and dementia ,in [facility] he was  combative and aggressive with medical staff and psychiatric evaluation is  requested Admit for iv hydration, monitor renal profile and urine output ,nutritionist consult ,prealbumin level ,serial bmp, nutritional supplements, multivitamins ,palliative care evaluation  regarding MOLST completion and artificial nutrition discussion . PALLIATIVE CARE CONSULT AND NUTRITIONIST CONSULT REQUESTED ,WIFE IS CONSIDERING CMO ,ANTICIPATE D/C TO [facility] IN 24-48 HRS WITH CMO AND MOLST COMPLETED .
72 yo AAM with hx of ALZHEIMER'S DISEASE ,SUBDURAL HEMATOMA, HTN, CAD ,GERD , HARISH ,VIT B12 DEFICIENCY ,GOUT ,CONSTIPATION .brought to ED from [facility] due to poor po intake and lethargy ,worsening for 3 days . Patient refused iv line placement and labs due to severe agitation and dementia ,in [facility] he was  combative and aggressive with medical staff and psychiatric evaluation is  requested Admit for iv hydration, monitor renal profile and urine output ,nutritionist consult ,prealbumin level ,serial bmp, nutritional supplements, multivitamins ,palliative care evaluation  regarding MOLST completion and artificial nutrition discussion .
74 yo AAM with hx of ALZHEIMER'S DISEASE ,SUBDURAL HEMATOMA, HTN, CAD ,GERD , HARISH ,VIT B12 DEFICIENCY ,GOUT ,CONSTIPATION .brought to ED from [facility] due to poor po intake and lethargy ,worsening for 3 days . Patient refused iv line placement and labs due to severe agitation and dementia ,in [facility] he was  combative and aggressive with medical staff and psychiatric evaluation is  requested Admit for iv hydration, monitor renal profile and urine output ,nutritionist consult ,prealbumin level ,serial bmp, nutritional supplements, multivitamins ,palliative care evaluation  regarding MOLST completion and artificial nutrition discussion .
72 yo AAM with hx of ALZHEIMER'S DISEASE ,SUBDURAL HEMATOMA, HTN, CAD ,GERD , HARISH ,VIT B12 DEFICIENCY ,GOUT ,CONSTIPATION .brought to ED from [facility] due to poor po intake and lethargy ,worsening for 3 days . Patient refused iv line placement and labs due to severe agitation and dementia ,in [facility] he was  combative and aggressive with medical staff and psychiatric evaluation is  requested Admit for iv hydration, monitor renal profile and urine output ,nutritionist consult ,prealbumin level ,serial bmp, nutritional supplements, multivitamins ,palliative care evaluation  regarding MOLST completion and artificial nutrition discussion . PALLIATIVE CARE CONSULT ,WIFE IS CONSIDERING CMO ,ANTICIPATE D/C TO [facility] IN 24-48 HRS WITH CMO AND MOLST COMPLETED .PATIENT IS NOT ABLE TO TAKE HIS PO MEDICATIONS AND MOST OF MEALS , WIFE IS AWARE OH HIGH RISK OF RETURN TO THE HOSPITAL IN FEW DAYS AFTER DISCHARGE ,SHE REFUSES NGT/GT ,PATIENT PROBABLY WILL PULL IT OUT DUE TO AGITATION. COMFORT CARE AT LTC FACILITY IS APPROPRIATE LEVEL OF CARE AT THIS POINT .
72 yo AAM with hx of ALZHEIMER'S DISEASE ,SUBDURAL HEMATOMA, HTN, CAD ,GERD , HARISH ,VIT B12 DEFICIENCY ,GOUT ,CONSTIPATION .brought to ED from [facility] due to poor po intake and lethargy ,worsening for 3 days . Patient refused iv line placement and labs due to severe agitation and dementia ,in [facility] he was  combative and aggressive with medical staff and psychiatric evaluation is  requested Admit for iv hydration, monitor renal profile and urine output ,nutritionist consult ,prealbumin level ,serial bmp, nutritional supplements, multivitamins ,palliative care evaluation  regarding MOLST completion and artificial nutrition discussion . PALLIATIVE CARE CONSULT ,WIFE IS CONSIDERING CMO ,ANTICIPATE D/C TO [facility] IN 24-48 HRS WITH CMO AND MOLST COMPLETED .PATIENT IS NOT ABLE TO TAKE HIS PO MEDICATIONS AND MOST OF MEALS , WIFE IS AWARE OH HIGH RISK OF RETURN TO THE HOSPITAL IN FEW DAYS AFTER DISCHARGE ,SHE REFUSES NGT/GT ,PATIENT PROBABLY WILL PULL IT OUT DUE TO AGITATION. COMFORT CARE AT LTC FACILITY IS APPOPRIATE LEVEL OF CARE AT THIS POINT .
72 yo AAM with hx of ALZHEIMER'S DISEASE ,SUBDURAL HEMATOMA, HTN, CAD ,GERD , HARISH ,VIT B12 DEFICIENCY ,GOUT ,CONSTIPATION .brought to ED from [facility] due to poor po intake and lethargy ,worsening for 3 days . Patient refused iv line placement and labs due to severe agitation and dementia ,in [facility] he was  combative and aggressive with medical staff and psychiatric evaluation is  requested Admit for iv hydration, monitor renal profile and urine output ,nutritionist consult ,prealbumin level ,serial bmp, nutritional supplements, multivitamins ,palliative care evaluation  regarding MOLST completion and artificial nutrition discussion . PALLIATIVE CARE CONSULT ,WIFE IS CONSIDERING CMO ,                                          ANTICIPATE D/C TO [facility] IN 24-48 HRS WITH CMO AND MOLST COMPLETED .PATIENT IS NOT ABLE TO TAKE HIS PO MEDICATIONS AND MOST OF MEALS , WIFE IS AWARE OH HIGH RISK OF RETURN TO THE HOSPITAL IN FEW DAYS AFTER DISCHARGE ,SHE REFUSES NGT/GT ,PATIENT PROBABLY WILL PULL IT OUT DUE TO AGITATION. COMFORT CARE AT LTC FACILITY IS APPROPRIATE LEVEL OF CARE AT THIS POINT .
72 yo AAM with hx of ALZHEIMER'S DISEASE ,SUBDURAL HEMATOMA, HTN, CAD ,GERD , HARISH ,VIT B12 DEFICIENCY ,GOUT ,CONSTIPATION .brought to ED from [facility] due to poor po intake and lethargy ,worsening for 3 days . Patient refused iv line placement and labs due to severe agitation and dementia ,in [facility] he was  combative and aggressive with medical staff and psychiatric evaluation is  requested Admit for iv hydration, monitor renal profile and urine output ,nutritionist consult ,prealbumin level ,serial bmp, nutritional supplements, multivitamins ,palliative care evaluation  regarding MOLST completion and artificial nutrition discussion . PALLIATIVE CARE CONSULT ,WIFE IS CONSIDERING CMO ,                                          ANTICIPATE D/C TO [facility] IN 24-48 HRS WITH CMO/LIMITED MEDICAL INTERVENTIONS  AND MOLST COMPLETED .                                                         PATIENT IS NOT ABLE TO TAKE HIS PO MEDICATIONS AND MOST OF MEALS , WIFE IS AWARE OH HIGH RISK OF RETURN TO THE HOSPITAL IN FEW DAYS AFTER DISCHARGE ,SHE REFUSES NGT/GT ,PATIENT PROBABLY WILL PULL IT OUT DUE TO AGITATION. COMFORT CARE AT LTC FACILITY IS APPROPRIATE LEVEL OF CARE AT THIS POINT .

## 2018-12-18 LAB
CULTURE RESULTS: SIGNIFICANT CHANGE UP
SPECIMEN SOURCE: SIGNIFICANT CHANGE UP

## 2019-05-13 PROBLEM — Z00.00 ENCOUNTER FOR PREVENTIVE HEALTH EXAMINATION: Status: ACTIVE | Noted: 2019-05-13

## 2022-05-30 NOTE — GOALS OF CARE CONVERSATION - PERSONAL ADVANCE DIRECTIVE - TREATMENT GUIDELINES
Problem: Occupational Therapy  Goal: Occupational Therapy Goal  Description: Goals to be met by: 6/2    Patient will increase functional independence with ADLs by performing:    UE Dressing with Lynn.  LE Dressing with Moderate Assistance using AE as needed.  Grooming while seated at sink with Lynn.  Toileting from bedside commode or toilet with Minimal Assistance for hygiene and clothing management.   Bathing from shower chair/chair level with Minimal Assistance.  Stand pivot transfers with Minimal Assistance using RW. Met  Stand pivot transfers with supervision.  Toilet transfer to bedside commode with Minimal Assistance. Met to BSC over toilet  Toilet transfer to bedside commode with supervision.  Upper extremity exercise program with supervision.  Caregiver will be educated on level of assist required to safely perform self care tasks and functional transfers.    Outcome: Ongoing, Progressing  Patient's goals are appropriate.    dni/DNR Order/No artificial nutrition/IV fluid trial

## 2023-07-14 NOTE — CHART NOTE - NSCHARTNOTEFT_GEN_A_CORE
Assessment: 73 year old male pmhx significant for alzheimers disease adm from [facility] c poor po intake and lethargy.  Pt also admitted c hypernatremia secondary to dehydration, which is now resolved; continues on D5NaCl@50ml/hr.  Po intake overall continues to remain inadequate; per RN, pt has refused meals today.  Pt was seen for SLP evaluation 12/12 and pt p/w oropharyngeal dysphagia- recommended Dyspaghia #1 pureed diet c nectar thick consistency fluids, which pt is now on.  Additional nutrition supplements were added to diet order but to no avail.  It appears inadequate oral intake is related to progression of alzheimers disease.  Per chart, artificial nutrition has been discussed c wife- she is currently refusing NGT or PEG as pt is likely to pull out during periods of agitation. RD to continue to follow closely and will make tube feeding recommendations if wife were to be agreeable to AN in the future.  Per transfer papers, pt's weight was 257# (no date provided); on admission (12/6/18), pt's weight was documented at 225# and as of 12/12/18, pt's weight was 219.8# (indicating 2.3% x 7 days, which is significant).  Pt has also had <75% energy intake compared to estimated energy needs for >1 month.  Prealbumin is also noted 9 mg/dl suggesting malnutrition.  Based on significant wt loss and inadequate intake pt meets clinical characteristics of moderate malnutrition in context of chronic illness.    Factors impacting intake: [ ] none [ ] nausea  [ ] vomiting [ ] diarrhea [ ] constipation  [ ]chewing problems [ ] swallowing issues  [ x] other: alzheimers disease    Diet Presciption: Diet, Dysphagia 1 Pureed-Nectar Consistency Fluid:   Supplement Feeding Modality:  Oral  Ensure Enlive Servings Per Day:  1       Frequency:  Two Times a day (12-12-18 @ 13:55)    Intake: inadequate, usually refuses meals    Current Weight: on admission (12/6/18), pt's weight was documented at 225# and as of 12/12/18, pt's weight was 219.8# (indicating 2.3% x 7 days, which is significant)      Pertinent Medications: MEDICATIONS  (STANDING):  cyanocobalamin 500 MICROGram(s) Oral daily  dextrose 5%. 1000 milliLiter(s) (50 mL/Hr) IV Continuous <Continuous>  famotidine    Tablet 20 milliGRAM(s) Oral daily  heparin  Injectable 5000 Unit(s) SubCutaneous every 12 hours  piperacillin/tazobactam IVPB. 3.375 Gram(s) IV Intermittent every 8 hours  polyethylene glycol 3350 17 Gram(s) Oral two times a day  QUEtiapine 100 milliGRAM(s) Oral at bedtime  risperiDONE  Disintegrating Tablet 1 milliGRAM(s) Oral daily  risperiDONE  Disintegrating Tablet 1 milliGRAM(s) Oral <User Schedule>  senna 2 Tablet(s) Oral at bedtime  tamsulosin 0.8 milliGRAM(s) Oral at bedtime  valproic  acid Syrup 500 milliGRAM(s) Oral <User Schedule>  valproic  acid Syrup 1000 milliGRAM(s) Oral at bedtime    MEDICATIONS  (PRN):  acetaminophen   Tablet .. 650 milliGRAM(s) Oral every 6 hours PRN Temp greater or equal to 38C (100.4F), Mild Pain (1 - 3)  haloperidol    Injectable 3 milliGRAM(s) IntraMuscular every 4 hours PRN severe agitation like pulling lines  traMADol 25 milliGRAM(s) Oral every 6 hours PRN Moderate Pain (4 - 6)    Pertinent Labs: 12-13 Na141 mmol/L Glu 134 mg/dL<H> K+ 4.3 mmol/L Cr  1.12 mg/dL BUN 12 mg/dL 12-13 Alb 2.0 g/dL<L> 12-07 PAB 9 mg/dL<L>     CAPILLARY BLOOD GLUCOSE        Skin: scab on forehead    Estimated Needs:   [x ] no change since previous assessment  [ ] recalculated:     Previous Nutrition Diagnosis:   [ ] Inadequate Energy Intake [ x]Inadequate Oral Intake [ ] Excessive Energy Intake   [ ] Underweight [ ] Increased Nutrient Needs [ ] Overweight/Obesity   [ ] Altered GI Function [ ] Unintended Weight Loss [ ] Food & Nutrition Related Knowledge Deficit [ ] Malnutrition     Nutrition Diagnosis is [x ] ongoing  [ ] resolved [ ] not applicable     New Nutrition Diagnosis: [ ] not applicable     malnutrition      Interventions: dysphagia diet, nutritional supplements  Recommend  [ ] Change Diet To:  [ ] Nutrition Supplement  [ x] Nutrition Support- final decision re: peg  [ ] Other:     Monitoring and Evaluation:   [ x] PO intake [ x ] Tolerance to diet prescription [ x ] weights [ x ] labs[ x ] follow up per protocol  [ ] other: Event Note

## 2023-09-27 RX ORDER — SENNA PLUS 8.6 MG/1
1 TABLET ORAL
Qty: 0 | Refills: 0 | DISCHARGE

## 2023-09-27 RX ORDER — DIVALPROEX SODIUM 500 MG/1
2 TABLET, DELAYED RELEASE ORAL
Qty: 0 | Refills: 0 | DISCHARGE

## 2023-09-27 RX ORDER — POLYETHYLENE GLYCOL 3350 17 G/17G
0 POWDER, FOR SOLUTION ORAL
Qty: 0 | Refills: 0 | DISCHARGE

## 2023-09-27 RX ORDER — QUETIAPINE FUMARATE 200 MG/1
0 TABLET, FILM COATED ORAL
Qty: 0 | Refills: 0 | DISCHARGE

## 2023-09-27 RX ORDER — DIVALPROEX SODIUM 500 MG/1
1 TABLET, DELAYED RELEASE ORAL
Qty: 0 | Refills: 0 | DISCHARGE

## 2023-09-27 RX ORDER — TAMSULOSIN HYDROCHLORIDE 0.4 MG/1
1 CAPSULE ORAL
Qty: 0 | Refills: 0 | DISCHARGE

## 2023-09-27 RX ORDER — TRAMADOL HYDROCHLORIDE 50 MG/1
0.5 TABLET ORAL
Qty: 0 | Refills: 0 | DISCHARGE

## 2023-09-27 RX ORDER — ACETAMINOPHEN 500 MG
2 TABLET ORAL
Qty: 0 | Refills: 0 | DISCHARGE

## 2023-09-27 RX ORDER — PREGABALIN 225 MG/1
1 CAPSULE ORAL
Qty: 0 | Refills: 0 | DISCHARGE

## 2024-01-23 NOTE — PROGRESS NOTE ADULT - PROBLEM SELECTOR PROBLEM 1
VIRI (acute kidney injury) Elevated troponin level not due myocardial infarction Prophylactic measure

## 2024-03-12 NOTE — PROGRESS NOTE ADULT - SUBJECTIVE AND OBJECTIVE BOX
PROGRESS NOTE  Patient is a 73y old  Male who presents with a chief complaint of poor po intake and lethargy (07 Dec 2018 09:07)  Chart and available morning labs /imaging are reviewed electronically , urgent issues addressed . More information  is being added upon completion of rounds , when more information is collected and management discussed with consultants , medical staff and social service/case management on the floor   OVERNIGHT  No new issues reported by medical staff . All above noted Patient is resting in a bed comfortably .Confused ,poor mentation .No distress noted .Refused meal this morning , didnt take depakote .Spoke to the wife and she is aware of above .   HPI:  72 yo AAM with hx of ALZHEIMER'S DISEASE ,SUBDURAL HEMATOMA, HTN, CAD ,GERD , HARISH ,VIT B12 DEFICIENCY ,GOUT ,CONSTIPATION .brought to ED from [facility] due to poor po intake and lethargy ,worsening for 3 days . Patient refused iv line placement and labs due to severe agitation and dementia ,in [facility] he was  combative and aggressive with medical staff and psychiatric evaluation is  requested Admit for iv hydration, monitor renal profile and urine output ,nutritionist consult ,prealbumin level ,serial bmp, nutritional supplements, multivitamins ,palliative care evaluation  regarding MOLST completion and artificial nutrition discussion . (06 Dec 2018 15:20)  PAST MEDICAL & SURGICAL HISTORY:  Alzheimer disease  Urinary retention  BPH (benign prostatic hyperplasia)  Constipation  HTN (hypertension)  SDH (subdural hematoma)  HARISH (obstructive sleep apnea)  GERD (gastroesophageal reflux disease)  Agitation  Dementia  ASHD (arteriosclerotic heart disease)  Gout  MEDICATIONS  (STANDING):  cyanocobalamin 500 MICROGram(s) Oral daily  dextrose 5%. 1000 milliLiter(s) (100 mL/Hr) IV Continuous <Continuous>  diVALproex  milliGRAM(s) Oral <User Schedule>  diVALproex DR 1000 milliGRAM(s) Oral at bedtime  famotidine    Tablet 20 milliGRAM(s) Oral daily  heparin  Injectable 5000 Unit(s) SubCutaneous every 12 hours  polyethylene glycol 3350 17 Gram(s) Oral two times a day  QUEtiapine 100 milliGRAM(s) Oral at bedtime  senna 2 Tablet(s) Oral at bedtime  tamsulosin 0.8 milliGRAM(s) Oral at bedtime  MEDICATIONS  (PRN):  acetaminophen   Tablet .. 650 milliGRAM(s) Oral every 6 hours PRN Temp greater or equal to 38C (100.4F), Mild Pain (1 - 3)  haloperidol    Injectable 1 milliGRAM(s) IntraMuscular every 6 hours PRN Agitation  traMADol 25 milliGRAM(s) Oral every 6 hours PRN Moderate Pain (4 - 6)  OBJECTIVE  T(C): 37.7 (12-07-18 @ 09:12), Max: 37.7 (12-07-18 @ 09:12)  HR: 79 (12-07-18 @ 09:12) (72 - 92)  BP: 139/756 (12-07-18 @ 09:12) (124/73 - 146/96)  RR: 18 (12-07-18 @ 09:12) (16 - 18)  SpO2: 93% (12-07-18 @ 09:12) (93% - 99%)  Wt(kg): --  I&O's Summary  06 Dec 2018 07:01  -  07 Dec 2018 07:00  --------------------------------------------------------  IN: 1000 mL / OUT: 0 mL / NET: 1000 mL  07 Dec 2018 07:01  -  07 Dec 2018 09:15  --------------------------------------------------------  IN: 200 mL / OUT: 0 mL / NET: 200 mL  REVIEW OF SYSTEMS:  ROS is unobtainable due to lethargy and confusion   PHYSICAL EXAM:  Appearance: NAD. VS past 24 hrs -as above   HEENT:   Moist oral mucosa. Conjunctiva clear b/l.   Neck : supple  Respiratory: Lungs CTAB.  Gastrointestinal:  Soft, nontender. No rebound. No rigidity. BS present	  Cardiovascular: RRR ,S1S2 present  Neurologic: Non-focal. Moving all extremities.  Extremities: No edema. No erythema. No calf tenderness.  Skin: No rashes, No ecchymoses, No cyanosis.	  wounds ,skin lesions-See skin assesment flow sheet   LABS:                      11.9   9.86  )-----------( 302      ( 07 Dec 2018 08:26 )             38.2   12-07  149<H>  |  111<H>  |  21  ----------------------------<  109<H>  4.0   |  32<H>  |  1.03  Ca    8.7      07 Dec 2018 08:26  TPro  7.7  /  Alb  2.4<L>  /  TBili  0.7  /  DBili  x   /  AST  25  /  ALT  39  /  AlkPhos  55  12-06  CAPILLARY BLOOD GLUCOSE  PT/INR - ( 06 Dec 2018 13:42 )   PT: 14.3 sec;   INR: 1.30 ratio    PTT - ( 06 Dec 2018 13:42 )  PTT:28.5 sec  RADIOLOGY & ADDITIONAL TESTS:   reviewed elctronically  ASSESSMENT/PLAN: Other
